# Patient Record
Sex: MALE | Race: WHITE | Employment: OTHER | URBAN - METROPOLITAN AREA
[De-identification: names, ages, dates, MRNs, and addresses within clinical notes are randomized per-mention and may not be internally consistent; named-entity substitution may affect disease eponyms.]

---

## 2017-03-14 ENCOUNTER — TELEPHONE (OUTPATIENT)
Dept: CARDIOLOGY CLINIC | Age: 62
End: 2017-03-14

## 2017-03-14 ENCOUNTER — OFFICE VISIT (OUTPATIENT)
Dept: CARDIOLOGY CLINIC | Age: 62
End: 2017-03-14

## 2017-03-14 VITALS
BODY MASS INDEX: 26.22 KG/M2 | SYSTOLIC BLOOD PRESSURE: 138 MMHG | HEIGHT: 69 IN | HEART RATE: 81 BPM | DIASTOLIC BLOOD PRESSURE: 88 MMHG | OXYGEN SATURATION: 96 % | WEIGHT: 177 LBS

## 2017-03-14 DIAGNOSIS — I25.10 CORONARY ARTERY DISEASE INVOLVING NATIVE HEART WITHOUT ANGINA PECTORIS, UNSPECIFIED VESSEL OR LESION TYPE: Primary | ICD-10-CM

## 2017-03-14 PROCEDURE — 99215 OFFICE O/P EST HI 40 MIN: CPT | Performed by: INTERNAL MEDICINE

## 2017-03-14 RX ORDER — HYDROCHLOROTHIAZIDE 25 MG/1
25 TABLET ORAL DAILY
Qty: 30 TABLET | Refills: 3 | Status: ON HOLD | OUTPATIENT
Start: 2017-03-14 | End: 2017-03-25 | Stop reason: HOSPADM

## 2017-03-17 ENCOUNTER — HOSPITAL ENCOUNTER (OUTPATIENT)
Dept: OTHER | Age: 62
Discharge: HOME | End: 2017-03-17
Attending: INTERNAL MEDICINE | Admitting: INTERNAL MEDICINE

## 2017-03-17 DIAGNOSIS — I25.10 CORONARY ARTERY DISEASE INVOLVING NATIVE HEART WITHOUT ANGINA PECTORIS, UNSPECIFIED VESSEL OR LESION TYPE: ICD-10-CM

## 2017-03-17 PROBLEM — I20.0 UNSTABLE ANGINA (HCC): Status: ACTIVE | Noted: 2017-03-17

## 2017-03-17 LAB
ALBUMIN SERPL-MCNC: 4.4 G/DL (ref 3.4–5)
ALP BLD-CCNC: 59 U/L (ref 40–129)
ALT SERPL-CCNC: 29 U/L (ref 10–40)
ANION GAP SERPL CALCULATED.3IONS-SCNC: 13 MMOL/L (ref 3–16)
AST SERPL-CCNC: 27 U/L (ref 15–37)
BILIRUB SERPL-MCNC: 0.5 MG/DL (ref 0–1)
BILIRUBIN DIRECT: <0.2 MG/DL (ref 0–0.3)
BILIRUBIN, INDIRECT: NORMAL MG/DL (ref 0–1)
BUN BLDV-MCNC: 14 MG/DL (ref 7–20)
CALCIUM SERPL-MCNC: 9.1 MG/DL (ref 8.3–10.6)
CHLORIDE BLD-SCNC: 97 MMOL/L (ref 99–110)
CHOLESTEROL, TOTAL: 164 MG/DL (ref 0–199)
CO2: 26 MMOL/L (ref 21–32)
CREAT SERPL-MCNC: 0.7 MG/DL (ref 0.8–1.3)
GFR AFRICAN AMERICAN: >60
GFR NON-AFRICAN AMERICAN: >60
GLUCOSE BLD-MCNC: 100 MG/DL (ref 70–99)
HDLC SERPL-MCNC: 65 MG/DL (ref 40–60)
LDL CHOLESTEROL CALCULATED: 87 MG/DL
PHOSPHORUS: 2.9 MG/DL (ref 2.5–4.9)
POTASSIUM SERPL-SCNC: 5.4 MMOL/L (ref 3.5–5.1)
SODIUM BLD-SCNC: 136 MMOL/L (ref 136–145)
TOTAL PROTEIN: 7.4 G/DL (ref 6.4–8.2)
TRIGL SERPL-MCNC: 60 MG/DL (ref 0–150)
VLDLC SERPL CALC-MCNC: 12 MG/DL

## 2017-04-12 ENCOUNTER — OFFICE VISIT (OUTPATIENT)
Dept: CARDIOTHORACIC SURGERY | Age: 62
End: 2017-04-12

## 2017-04-12 VITALS
SYSTOLIC BLOOD PRESSURE: 110 MMHG | DIASTOLIC BLOOD PRESSURE: 80 MMHG | BODY MASS INDEX: 25.18 KG/M2 | HEART RATE: 78 BPM | TEMPERATURE: 98.4 F | HEIGHT: 69 IN | WEIGHT: 170 LBS | OXYGEN SATURATION: 99 %

## 2017-04-12 DIAGNOSIS — I25.110 CORONARY ARTERY DISEASE INVOLVING NATIVE CORONARY ARTERY OF NATIVE HEART WITH UNSTABLE ANGINA PECTORIS (HCC): ICD-10-CM

## 2017-04-12 DIAGNOSIS — I20.0 UNSTABLE ANGINA (HCC): Primary | ICD-10-CM

## 2017-04-12 PROCEDURE — 99024 POSTOP FOLLOW-UP VISIT: CPT | Performed by: THORACIC SURGERY (CARDIOTHORACIC VASCULAR SURGERY)

## 2017-04-12 RX ORDER — OXYCODONE HYDROCHLORIDE AND ACETAMINOPHEN 5; 325 MG/1; MG/1
1 TABLET ORAL EVERY 4 HOURS PRN
COMMUNITY
End: 2017-04-12 | Stop reason: SDUPTHER

## 2017-04-12 RX ORDER — OXYCODONE HYDROCHLORIDE AND ACETAMINOPHEN 5; 325 MG/1; MG/1
1 TABLET ORAL EVERY 4 HOURS PRN
Qty: 60 TABLET | Refills: 0 | Status: SHIPPED | OUTPATIENT
Start: 2017-04-12 | End: 2019-02-25

## 2017-04-26 ENCOUNTER — OFFICE VISIT (OUTPATIENT)
Dept: CARDIOTHORACIC SURGERY | Age: 62
End: 2017-04-26

## 2017-04-26 VITALS
SYSTOLIC BLOOD PRESSURE: 120 MMHG | WEIGHT: 169 LBS | OXYGEN SATURATION: 98 % | BODY MASS INDEX: 25.03 KG/M2 | DIASTOLIC BLOOD PRESSURE: 84 MMHG | HEART RATE: 74 BPM | TEMPERATURE: 97.9 F | HEIGHT: 69 IN

## 2017-04-26 DIAGNOSIS — I20.0 UNSTABLE ANGINA (HCC): Primary | ICD-10-CM

## 2017-04-26 DIAGNOSIS — E78.00 PURE HYPERCHOLESTEROLEMIA: ICD-10-CM

## 2017-04-26 DIAGNOSIS — I10 ESSENTIAL HYPERTENSION, BENIGN: ICD-10-CM

## 2017-04-26 DIAGNOSIS — I25.110 CORONARY ARTERY DISEASE INVOLVING NATIVE CORONARY ARTERY OF NATIVE HEART WITH UNSTABLE ANGINA PECTORIS (HCC): ICD-10-CM

## 2017-04-26 PROCEDURE — 99024 POSTOP FOLLOW-UP VISIT: CPT | Performed by: THORACIC SURGERY (CARDIOTHORACIC VASCULAR SURGERY)

## 2017-04-27 ENCOUNTER — OFFICE VISIT (OUTPATIENT)
Dept: CARDIOLOGY CLINIC | Age: 62
End: 2017-04-27

## 2017-04-27 VITALS
HEART RATE: 66 BPM | WEIGHT: 169 LBS | HEIGHT: 69 IN | OXYGEN SATURATION: 98 % | DIASTOLIC BLOOD PRESSURE: 64 MMHG | SYSTOLIC BLOOD PRESSURE: 90 MMHG | BODY MASS INDEX: 25.03 KG/M2

## 2017-04-27 DIAGNOSIS — I10 ESSENTIAL HYPERTENSION, BENIGN: ICD-10-CM

## 2017-04-27 DIAGNOSIS — E78.00 PURE HYPERCHOLESTEROLEMIA: Primary | ICD-10-CM

## 2017-04-27 DIAGNOSIS — I25.110 CORONARY ARTERY DISEASE INVOLVING NATIVE CORONARY ARTERY OF NATIVE HEART WITH UNSTABLE ANGINA PECTORIS (HCC): ICD-10-CM

## 2017-04-27 PROCEDURE — 99214 OFFICE O/P EST MOD 30 MIN: CPT | Performed by: INTERNAL MEDICINE

## 2017-04-27 RX ORDER — ATORVASTATIN CALCIUM 40 MG/1
80 TABLET, FILM COATED ORAL NIGHTLY
Qty: 60 TABLET | Refills: 3
Start: 2017-04-27 | End: 2017-07-26 | Stop reason: DRUGHIGH

## 2017-04-27 RX ORDER — LISINOPRIL 5 MG/1
5 TABLET ORAL
COMMUNITY
Start: 2009-05-13 | End: 2017-06-01 | Stop reason: DRUGHIGH

## 2017-04-28 ENCOUNTER — TELEPHONE (OUTPATIENT)
Dept: CARDIOLOGY CLINIC | Age: 62
End: 2017-04-28

## 2017-05-25 RX ORDER — ISOSORBIDE MONONITRATE 30 MG/1
30 TABLET, EXTENDED RELEASE ORAL DAILY
Qty: 90 TABLET | Refills: 3 | Status: SHIPPED | OUTPATIENT
Start: 2017-05-25 | End: 2017-06-22 | Stop reason: SDUPTHER

## 2017-06-01 ENCOUNTER — OFFICE VISIT (OUTPATIENT)
Dept: CARDIOLOGY CLINIC | Age: 62
End: 2017-06-01

## 2017-06-01 VITALS
HEART RATE: 66 BPM | WEIGHT: 172 LBS | SYSTOLIC BLOOD PRESSURE: 118 MMHG | HEIGHT: 69 IN | DIASTOLIC BLOOD PRESSURE: 86 MMHG | BODY MASS INDEX: 25.48 KG/M2

## 2017-06-01 DIAGNOSIS — I10 ESSENTIAL HYPERTENSION, BENIGN: ICD-10-CM

## 2017-06-01 DIAGNOSIS — Z95.1 S/P CABG X 4: ICD-10-CM

## 2017-06-01 DIAGNOSIS — I20.0 UNSTABLE ANGINA (HCC): ICD-10-CM

## 2017-06-01 DIAGNOSIS — I25.110 CORONARY ARTERY DISEASE INVOLVING NATIVE CORONARY ARTERY OF NATIVE HEART WITH UNSTABLE ANGINA PECTORIS (HCC): Primary | ICD-10-CM

## 2017-06-01 DIAGNOSIS — E78.00 PURE HYPERCHOLESTEROLEMIA: ICD-10-CM

## 2017-06-01 PROCEDURE — 99214 OFFICE O/P EST MOD 30 MIN: CPT | Performed by: NURSE PRACTITIONER

## 2017-06-01 PROCEDURE — 93000 ELECTROCARDIOGRAM COMPLETE: CPT | Performed by: NURSE PRACTITIONER

## 2017-06-01 RX ORDER — NITROGLYCERIN 0.4 MG/1
0.4 TABLET SUBLINGUAL EVERY 5 MIN PRN
Qty: 25 TABLET | Refills: 3 | Status: SHIPPED | OUTPATIENT
Start: 2017-06-01 | End: 2022-03-24 | Stop reason: SDUPTHER

## 2017-06-01 RX ORDER — LISINOPRIL 5 MG/1
5 TABLET ORAL NIGHTLY
Qty: 30 TABLET | Refills: 3 | Status: SHIPPED
Start: 2017-06-01 | End: 2018-07-10 | Stop reason: SDUPTHER

## 2017-06-22 ENCOUNTER — OFFICE VISIT (OUTPATIENT)
Dept: CARDIOLOGY CLINIC | Age: 62
End: 2017-06-22

## 2017-06-22 VITALS
BODY MASS INDEX: 25.18 KG/M2 | HEIGHT: 69 IN | SYSTOLIC BLOOD PRESSURE: 98 MMHG | HEART RATE: 72 BPM | DIASTOLIC BLOOD PRESSURE: 78 MMHG | WEIGHT: 170 LBS

## 2017-06-22 DIAGNOSIS — E78.00 PURE HYPERCHOLESTEROLEMIA: ICD-10-CM

## 2017-06-22 DIAGNOSIS — I25.118 CORONARY ARTERY DISEASE OF NATIVE ARTERY OF NATIVE HEART WITH STABLE ANGINA PECTORIS (HCC): Primary | ICD-10-CM

## 2017-06-22 DIAGNOSIS — I10 ESSENTIAL HYPERTENSION, BENIGN: ICD-10-CM

## 2017-06-22 DIAGNOSIS — Z95.1 S/P CABG X 4: ICD-10-CM

## 2017-06-22 PROCEDURE — 99214 OFFICE O/P EST MOD 30 MIN: CPT | Performed by: NURSE PRACTITIONER

## 2017-06-22 RX ORDER — ISOSORBIDE MONONITRATE 30 MG/1
30 TABLET, EXTENDED RELEASE ORAL DAILY
Qty: 90 TABLET | Refills: 3
Start: 2017-06-22 | End: 2017-12-19 | Stop reason: DRUGHIGH

## 2017-07-05 PROBLEM — I20.8 STABLE ANGINA (HCC): Status: ACTIVE | Noted: 2017-07-05

## 2017-07-05 PROBLEM — I20.89 STABLE ANGINA: Status: ACTIVE | Noted: 2017-07-05

## 2017-07-13 ENCOUNTER — TELEPHONE (OUTPATIENT)
Dept: CARDIOLOGY CLINIC | Age: 62
End: 2017-07-13

## 2017-07-13 DIAGNOSIS — I25.110 CORONARY ARTERY DISEASE INVOLVING NATIVE CORONARY ARTERY OF NATIVE HEART WITH UNSTABLE ANGINA PECTORIS (HCC): Primary | ICD-10-CM

## 2017-07-13 DIAGNOSIS — R07.9 CHEST PAIN, UNSPECIFIED TYPE: ICD-10-CM

## 2017-07-14 ENCOUNTER — TELEPHONE (OUTPATIENT)
Dept: CARDIOLOGY CLINIC | Age: 62
End: 2017-07-14

## 2017-07-20 ENCOUNTER — HOSPITAL ENCOUNTER (OUTPATIENT)
Dept: CARDIOLOGY | Facility: CLINIC | Age: 62
Discharge: HOME | End: 2017-07-20
Attending: INTERNAL MEDICINE | Admitting: INTERNAL MEDICINE

## 2017-07-20 LAB
LV EF: 71 %
LVEF MODALITY: NORMAL

## 2017-07-21 ENCOUNTER — TELEPHONE (OUTPATIENT)
Dept: CARDIOLOGY CLINIC | Age: 62
End: 2017-07-21

## 2017-07-24 RX ORDER — RANOLAZINE 500 MG/1
TABLET, FILM COATED, EXTENDED RELEASE ORAL
Qty: 60 TABLET | Refills: 5 | OUTPATIENT
Start: 2017-07-24

## 2017-07-24 RX ORDER — RANOLAZINE 1000 MG/1
500 TABLET, EXTENDED RELEASE ORAL 2 TIMES DAILY
Qty: 180 TABLET | Refills: 3 | Status: SHIPPED | OUTPATIENT
Start: 2017-07-24 | End: 2018-06-25 | Stop reason: SDUPTHER

## 2017-07-25 ENCOUNTER — TELEPHONE (OUTPATIENT)
Dept: CARDIOLOGY CLINIC | Age: 62
End: 2017-07-25

## 2017-07-26 ENCOUNTER — OFFICE VISIT (OUTPATIENT)
Dept: CARDIOLOGY CLINIC | Age: 62
End: 2017-07-26

## 2017-07-26 VITALS
OXYGEN SATURATION: 97 % | DIASTOLIC BLOOD PRESSURE: 70 MMHG | HEIGHT: 69 IN | SYSTOLIC BLOOD PRESSURE: 100 MMHG | WEIGHT: 169 LBS | BODY MASS INDEX: 25.03 KG/M2 | HEART RATE: 75 BPM

## 2017-07-26 DIAGNOSIS — I10 ESSENTIAL HYPERTENSION, BENIGN: ICD-10-CM

## 2017-07-26 DIAGNOSIS — E78.00 PURE HYPERCHOLESTEROLEMIA: Primary | ICD-10-CM

## 2017-07-26 DIAGNOSIS — Z95.5 S/P CORONARY ARTERY STENT PLACEMENT: ICD-10-CM

## 2017-07-26 DIAGNOSIS — I25.110 CORONARY ARTERY DISEASE INVOLVING NATIVE CORONARY ARTERY OF NATIVE HEART WITH UNSTABLE ANGINA PECTORIS (HCC): ICD-10-CM

## 2017-07-26 PROCEDURE — 99214 OFFICE O/P EST MOD 30 MIN: CPT | Performed by: INTERNAL MEDICINE

## 2017-07-26 RX ORDER — ATORVASTATIN CALCIUM 80 MG/1
80 TABLET, FILM COATED ORAL NIGHTLY
Qty: 30 TABLET | Refills: 3 | Status: SHIPPED
Start: 2017-07-26 | End: 2018-07-10 | Stop reason: SDUPTHER

## 2017-07-31 ENCOUNTER — TELEPHONE (OUTPATIENT)
Dept: CARDIOLOGY CLINIC | Age: 62
End: 2017-07-31

## 2017-08-03 ENCOUNTER — TELEPHONE (OUTPATIENT)
Dept: CARDIOLOGY CLINIC | Age: 62
End: 2017-08-03

## 2017-08-24 ENCOUNTER — TELEPHONE (OUTPATIENT)
Dept: CARDIOLOGY CLINIC | Age: 62
End: 2017-08-24

## 2017-08-28 ENCOUNTER — OFFICE VISIT (OUTPATIENT)
Dept: CARDIOLOGY CLINIC | Age: 62
End: 2017-08-28

## 2017-08-28 VITALS
DIASTOLIC BLOOD PRESSURE: 88 MMHG | WEIGHT: 174 LBS | SYSTOLIC BLOOD PRESSURE: 130 MMHG | BODY MASS INDEX: 25.77 KG/M2 | HEIGHT: 69 IN | HEART RATE: 68 BPM | OXYGEN SATURATION: 96 %

## 2017-08-28 DIAGNOSIS — I25.110 CORONARY ARTERY DISEASE INVOLVING NATIVE CORONARY ARTERY OF NATIVE HEART WITH UNSTABLE ANGINA PECTORIS (HCC): ICD-10-CM

## 2017-08-28 DIAGNOSIS — Z95.5 S/P CORONARY ARTERY STENT PLACEMENT: ICD-10-CM

## 2017-08-28 DIAGNOSIS — I50.32 CHRONIC DIASTOLIC CONGESTIVE HEART FAILURE (HCC): ICD-10-CM

## 2017-08-28 DIAGNOSIS — I10 ESSENTIAL HYPERTENSION, BENIGN: Primary | ICD-10-CM

## 2017-08-28 PROCEDURE — 99214 OFFICE O/P EST MOD 30 MIN: CPT | Performed by: INTERNAL MEDICINE

## 2017-09-28 ENCOUNTER — TELEPHONE (OUTPATIENT)
Dept: CARDIOLOGY CLINIC | Age: 62
End: 2017-09-28

## 2017-10-09 ENCOUNTER — OFFICE VISIT (OUTPATIENT)
Dept: CARDIOLOGY CLINIC | Age: 62
End: 2017-10-09

## 2017-10-09 VITALS
BODY MASS INDEX: 26.22 KG/M2 | OXYGEN SATURATION: 94 % | HEART RATE: 77 BPM | DIASTOLIC BLOOD PRESSURE: 68 MMHG | SYSTOLIC BLOOD PRESSURE: 114 MMHG | HEIGHT: 69 IN | WEIGHT: 177 LBS

## 2017-10-09 DIAGNOSIS — Z95.5 S/P CORONARY ARTERY STENT PLACEMENT: Primary | ICD-10-CM

## 2017-10-09 DIAGNOSIS — E78.00 PURE HYPERCHOLESTEROLEMIA: ICD-10-CM

## 2017-10-09 PROCEDURE — 99214 OFFICE O/P EST MOD 30 MIN: CPT | Performed by: INTERNAL MEDICINE

## 2017-10-09 RX ORDER — VITAMIN B COMPLEX
120 TABLET ORAL DAILY
Qty: 30 CAPSULE | Refills: 0 | Status: SHIPPED | OUTPATIENT
Start: 2017-10-09

## 2017-10-09 RX ORDER — NIACIN 500 MG/1
500 TABLET, EXTENDED RELEASE ORAL NIGHTLY
Qty: 30 TABLET | Refills: 3 | Status: SHIPPED | OUTPATIENT
Start: 2017-10-09 | End: 2018-07-10 | Stop reason: SDUPTHER

## 2017-10-09 NOTE — PROGRESS NOTES
[address]   Cardiac Followup    Referring Provider:  Jenette Habermann     Chief Complaint   Patient presents with    3 Month Follow-Up     2 month follow up    Coronary Artery Disease    Hypertension    Congestive Heart Failure    Hyperlipidemia    Chest Pain     only 2 times during cardiac rehab, started the week before last.     Edema     right leg from surgery,has been getting worse.  Fatigue     pt thinks due to rehab, since he cut back to 2 days a week      Subjective: Mr Sadaf Lilly presents today for cardiology follow up of recent [facility] s/p stents, hx CABG, CAD, HTN, HLD     History of Present Illness:    Mr. Sadaf Lilly is a 57yo male with hx of CABG. He also has history of CAD, HTN, and hyperlipidemia. He is s/p LAD and OM stent with PTCA only of the diagonal branch in 2003. On 5/12/09 he underwent repeat intervention with ALLYN of the OM branch overlapping a prior placed stent in 2003. His EF was vigorous and estimated at 70% per LVG. His small nondominant RCA was subtotally occluded, which was unchanged from 2003. Note cath on 9/30/11 for c/o new-onset exertional chest pain during office visit 9/23/11. Cath showed widely patent stents previously placed to the left anterior descending stent and obtuse marginal branches. There is mild diffuse disease noted throughout, but no critical focal stenoses. The right coronary artery is nondominant and has severe mid-vessel disease, but this is unchanged from May 2009 study. There are some L-R collaterals to the distal RCA noted as well. Most recent 11/22/2013 Lexiscan Myoview normal myocardial perfusion study. Hyperdynamic LV systolic function with DB>38% with uniform wall motion and normal perfusion with EF=71%. Due to exertional CP and SOB symptoms I referred him for [facility] on 3/17/17 which showed 70-80% LAD, 99% D1, 70% OM1, 90% OM2, 100% mid RCA; normal LVEF; normal hemodynamics. Currently, hemodynamically stable.  Referred for surgical Raji Cortez MD        Allergies:  Review of patient's allergies indicates no known allergies. Review of Systems:   · Constitutional: there has been no unanticipated weight loss. There's been no change in energy level, sleep pattern, or activity level. · Eyes: No visual changes or diplopia. No scleral icterus. · ENT: No Headaches, hearing loss or vertigo. No mouth sores or sore throat. · Cardiovascular: Reviewed in HPI  · Respiratory: No cough or wheezing, no sputum production. No hematemesis. · Gastrointestinal: No abdominal pain, appetite loss, blood in stools. No change in bowel or bladder habits. · Genitourinary: No dysuria, trouble voiding, or hematuria. · Musculoskeletal:  No gait disturbance, weakness or joint complaints. · Integumentary: No rash or pruritis. · Neurological: No headache, diplopia, change in muscle strength, numbness or tingling. No change in gait, balance, coordination, mood, affect, memory, mentation, behavior. · Psychiatric: No anxiety, no depression. · Endocrine: No malaise, fatigue or temperature intolerance. No excessive thirst, fluid intake, or urination. No tremor. · Hematologic/Lymphatic: No abnormal bruising or bleeding, blood clots or swollen lymph nodes. · Allergic/Immunologic: No nasal congestion or hives. Physical Examination:    Vitals:    10/09/17 0836   BP: 114/68   Pulse: 77   SpO2: 94%        Constitutional and General Appearance: NAD   Respiratory:  · Normal excursion and expansion without use of accessory muscles  · Resp Auscultation: Normal breath sounds without dullness  Cardiovascular:  · The apical impulses not displaced  · Heart tones are crisp and normal  · Cervical veins are not engorged  · The carotid upstroke is normal in amplitude and contour without delay or bruit  · Normal S1S2, No S3, No Murmur  · Peripheral pulses are symmetrical and full  · There is no clubbing, cyanosis of the extremities.   · Trace edema RLE wearing SWATI hose  · Femoral Arteries: 2+ and equal  · Pedal Pulses: 2+ and equal   Abdomen:  · No masses or tenderness  · Liver/Spleen: No Abnormalities Noted  Neurological/Psychiatric:  · Alert and oriented in all spheres  · Moves all extremities well  · Exhibits normal gait balance and coordination  · No abnormalities of mood, affect, memory, mentation, or behavior are noted    GXT stress Test 7/20/17   ABNORMAL HIGH risk stress test  Normal LV size and systolic function. Left ventricular ejection fraction of  71%. Normal wall motion. There is a large severe defect in the anterior and  anterolateral walls at stress that completely reveres with rest consistent  with ischemia. Lab Results   Component Value Date    CHOL 156 07/05/2017    CHOL 148 03/21/2017    CHOL 164 03/17/2017     Lab Results   Component Value Date    TRIG 152 (H) 07/05/2017    TRIG 129 03/21/2017    TRIG 60 03/17/2017     Lab Results   Component Value Date    HDL 52 07/05/2017    HDL 63 (H) 03/21/2017    HDL 65 (H) 03/17/2017     Lab Results   Component Value Date    LDLCALC 74 07/05/2017    [address] 59 03/21/2017    LDLCALC 87 03/17/2017     Lab Results   Component Value Date    LABVLDL 30 07/05/2017    LABVLDL 26 03/21/2017    LABVLDL 12 03/17/2017     No results found for: CHOLHDLRATIO    Assessment:     1. CAD (coronary artery disease):  Due to exertional CP and SOB symptoms I referred him for LHC on 3/17/17 which showed multi-vessel CAD. Referred for surgical revascularization and on 3/21/17 he underwent 4V CABG by Dr. Renu Vann with GONSALVES to LAD and reverse SVG to ramus OM distal to the stent and PDA. Most recent stress test 7/20/17 ABNORMAL HIGH risk stress test; LVEF of  71%. There is a large severe defect in the anterior and  anterolateral walls at stress that completely reversible with rest consistent with ischemia.  He underwent RHC, LHC, LVG on 7/5/17 showing severe disease left and right coronary systems with patent LIMA graft but disease of

## 2017-10-09 NOTE — COMMUNICATION BODY
revascularization and on 3/21/17 he underwent 4V CABG by Dr. Penne Skiff with LIMA to LAD and reverse SVG to ramus OM distal to the stent and PDA. Most recent VL carotid Duplex showed 3/20/17 showed minimal plaque <50% bilaterally. The vertebral arteries are patent with antegrade flow bilaterally. Most recent ECHO on 0/32/78 showed LV systolic function is normal with an ejection fraction of 60-65%. No wall motion abnormalities noted. Grade I diastolic dysfunction with normal filling pressure. Mild aortic stenosis (AV velocity=2.23m/s with mean pressure gradient=10mmHg). Note EKG 3/17/17 showed NSR 70bpm.    Most recent stress test 7/20/17 ABNORMAL HIGH risk stress test; LVEF of  71%. There is a large severe defect in the anterior and  anterolateral walls at stress that completely reversible with rest consistent with ischemia. He underwent RHC, LHC, LVG on 7/5/17 showing severe disease left and right coronary systems with patent LIMA graft but disease of remaining grafts and one occluded. Subsequently, he underwent most recent [facility] 8/7/17 PCI Complex LAD, diag Rt groin and on again 8/9/17 had PCI of complex circumflex, RCA  with Dr. Fred Rodriguez at [facility]. Amlodipine was discontinued by Dr. Fred Rodriguez. Most recent Limited ECHO 8/3/29 showed LV systolic function was normal.     Today patient states he has been feeling good. He is tolerating his medications. He states his Plavix has been increased to 150 mg daily by Dr. Liz Marin at [facility] (he went for 2nd opinion) based on genetic testing results. His blood work showed systemic inflammation with elevated CRP,  lipoprotein A, ADP response 42,.indidicating a lack of full response. At this time plavix was increased and niacin was added. Denies chest pain, shortness of breath, dizziness, palpitations and syncope. He as been attending cardiac rehab twice a week at [address] [place] [address],[address] and would like to continue going.   He states 2 weeks ago he did have some chest tightness and left arm Arteries: 2+ and equal  · Pedal Pulses: 2+ and equal   Abdomen:  · No masses or tenderness  · Liver/Spleen: No Abnormalities Noted  Neurological/Psychiatric:  · Alert and oriented in all spheres  · Moves all extremities well  · Exhibits normal gait balance and coordination  · No abnormalities of mood, affect, memory, mentation, or behavior are noted    GXT stress Test 7/20/17   ABNORMAL HIGH risk stress test  Normal LV size and systolic function. Left ventricular ejection fraction of  71%. Normal wall motion. There is a large severe defect in the anterior and  anterolateral walls at stress that completely reveres with rest consistent  with ischemia. Lab Results   Component Value Date    CHOL 156 07/05/2017    CHOL 148 03/21/2017    CHOL 164 03/17/2017     Lab Results   Component Value Date    TRIG 152 (H) 07/05/2017    TRIG 129 03/21/2017    TRIG 60 03/17/2017     Lab Results   Component Value Date    HDL 52 07/05/2017    HDL 63 (H) 03/21/2017    HDL 65 (H) 03/17/2017     Lab Results   Component Value Date    LDLCALC 74 07/05/2017    [address] 59 03/21/2017    LDLCALC 87 03/17/2017     Lab Results   Component Value Date    LABVLDL 30 07/05/2017    LABVLDL 26 03/21/2017    LABVLDL 12 03/17/2017     No results found for: CHOLHDLRATIO    Assessment:     1. CAD (coronary artery disease):  Due to exertional CP and SOB symptoms I referred him for LHC on 3/17/17 which showed  multi-vessel CAD. Referred for surgical revascularization and on 3/21/17 he underwent 4V CABG by Dr. Annemarie Rolon with LIMA to LAD and reverse SVG to ramus OM distal to the stent and PDA. Most recent stress test 7/20/17 ABNORMAL HIGH risk stress test; LVEF of  71%. There is a large severe defect in the anterior and  anterolateral walls at stress that completely reversible with rest consistent with ischemia.  He underwent RHC, LHC, LVG on 7/5/17 showing severe disease left and right coronary systems with patent LIMA graft but disease of remaining grafts and one occluded. Subsequently, he underwent most recent [facility] 8/7/17 PCI Complex LAD, diag Rt groin and on again 8/9/17 had PCI of complex circumflex, RCA  with Dr. Louis Flores at [facility]. Prior symptoms of exertional SOB and left arm pain are resolved. 2. Essential hypertension, benign:  Well controlled and will continue current medical regimen. 3.  Hyperlipidemia: 7/5/17 , HDL 52 LDL 74, . Well controlled and at goal and will continue current medical regimen. Plan:  1. Doing very well from cardiac standpoint. Couple episodes of angina likely but no further issues. Call if pattern of increasing frequency or severity of angina. 2. Continue all medications as prescribed   3. Elevate feet when possible and wear compression stockings   4. Referral to continue cardiac rehab at [facility]   5. Follow up with me in 3 months     Cost of prescription medications and patient compliance have been reviewed with patient. All questions answered. Thank you for allowing me to participate in the care of this individual.    Eliana Woodard.  Dasia Mejia M.D., [facility] [place]

## 2017-10-09 NOTE — LETTER
is unchanged from May 2009 study. There are some L-R collaterals to the distal RCA noted as well. Most recent 11/22/2013 Lexiscan Myoview normal myocardial perfusion study. Hyperdynamic LV systolic function with AK>88% with uniform wall motion and normal perfusion with EF=71%. Due to exertional CP and SOB symptoms I referred him for [facility] on 3/17/17 which showed 70-80% LAD, 99% D1, 70% OM1, 90% OM2, 100% mid RCA; normal LVEF; normal hemodynamics. Currently, hemodynamically stable. Referred for surgical revascularization and on 3/21/17 he underwent 4V CABG by Dr. Bakari Prasad with LIMA to LAD and reverse SVG to ramus OM distal to the stent and PDA. Most recent VL carotid Duplex showed 3/20/17 showed minimal plaque <50% bilaterally. The vertebral arteries are patent with antegrade flow bilaterally. Most recent ECHO on 0/29/09 showed LV systolic function is normal with an ejection fraction of 60-65%. No wall motion abnormalities noted. Grade I diastolic dysfunction with normal filling pressure. Mild aortic stenosis (AV velocity=2.23m/s with mean pressure gradient=10mmHg). Note EKG 3/17/17 showed NSR 70bpm.    Most recent stress test 7/20/17 ABNORMAL HIGH risk stress test; LVEF of  71%. There is a large severe defect in the anterior and  anterolateral walls at stress that completely reversible with rest consistent with ischemia. He underwent RHC, LHC, LVG on 7/5/17 showing severe disease left and right coronary systems with patent LIMA graft but disease of remaining grafts and one occluded. Subsequently, he underwent most recent [facility] 8/7/17 PCI Complex LAD, diag Rt groin and on again 8/9/17 had PCI of complex circumflex, RCA  with Dr. Cristy Jaeger at [facility]. Amlodipine was discontinued by Dr. Cristy Jaeger. Most recent Limited ECHO 5/3/27 showed LV systolic function was normal.     Today patient states he has been feeling good. He is tolerating his medications.  He states his Plavix has been increased to 150 mg daily by Dr. Viviana Ramírez at [facility] (he went for 2nd opinion) based on genetic testing results. His blood work showed systemic inflammation with elevated CRP,  lipoprotein A, ADP response 42,.indidicating a lack of full response. At this time plavix was increased and niacin was added. Denies chest pain, shortness of breath, dizziness, palpitations and syncope. He as been attending cardiac rehab twice a week at [address] [place] [address],[address] and would like to continue going. He states 2 weeks ago he did have some chest tightness and left arm pain. He was on the treadmill at the time and this has since resolved. He has no return of this pain and has been able to pressure wash his house. He does have swelling in his RLE (site of vein harvest) at times. Past Medical History   has a past medical history of CAD (coronary artery disease); Family history of early CAD; Hyperlipidemia; Hypertension; and S/P coronary artery stent placement. Surgical History:   has a past surgical history that includes Coronary angioplasty with stent; Cardiac catheterization (09/26/2011); Coronary artery bypass graft (03/21/2017); Cardiac catheterization (03/17/2017); Coronary angioplasty with stent (05/12/2009); Coronary angioplasty with stent (08/15/2003); and Coronary angioplasty with stent. Social History:   reports that he quit smoking about 24 years ago. He quit after 12.00 years of use. He has never used smokeless tobacco. He reports that he drinks alcohol. He reports that he does not use drugs. 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 Family History:  family history is not on file. Home Medications:  Prior to Admission medications    Medication Sig Start Date End Date Taking? Authorizing Provider   naproxen (NAPROXEN) 500 MG EC tablet Take 500 mg by mouth 2 times daily (with meals). Prn      Yes Historical Provider, MD   lisinopril (PRINIVIL;ZESTRIL) 10 MG tablet Take 1 tablet by mouth daily.  9/19/11  Yes Rox Pulido MD aspirin 81 MG tablet Take 81 mg by mouth daily. Yes Historical Provider, MD   ranitidine (ZANTAC) 150 MG tablet Take 150 mg by mouth daily as needed. Yes Historical Provider, MD   atorvastatin (LIPITOR) 40 MG tablet Take 40 mg by mouth nightly. Yes Historical Provider, MD   clopidogrel (PLAVIX) 75 MG tablet Take 75 mg by mouth daily. Yes Historical Provider, MD   nitroGLYCERIN (NITROSTAT) 0.4 MG SL tablet Place 1 tablet under the tongue every 5 minutes as needed for Chest pain. 9/19/11 9/18/12  Trish Lopez MD        Allergies:  Review of patient's allergies indicates no known allergies. Review of Systems:   · Constitutional: there has been no unanticipated weight loss. There's been no change in energy level, sleep pattern, or activity level. · Eyes: No visual changes or diplopia. No scleral icterus. · ENT: No Headaches, hearing loss or vertigo. No mouth sores or sore throat. · Cardiovascular: Reviewed in HPI  · Respiratory: No cough or wheezing, no sputum production. No hematemesis. · Gastrointestinal: No abdominal pain, appetite loss, blood in stools. No change in bowel or bladder habits. · Genitourinary: No dysuria, trouble voiding, or hematuria. · Musculoskeletal:  No gait disturbance, weakness or joint complaints. · Integumentary: No rash or pruritis. · Neurological: No headache, diplopia, change in muscle strength, numbness or tingling. No change in gait, balance, coordination, mood, affect, memory, mentation, behavior. · Psychiatric: No anxiety, no depression. · Endocrine: No malaise, fatigue or temperature intolerance. No excessive thirst, fluid intake, or urination. No tremor. · Hematologic/Lymphatic: No abnormal bruising or bleeding, blood clots or swollen lymph nodes. · Allergic/Immunologic: No nasal congestion or hives.     Physical Examination:    Vitals:    10/09/17 0836   BP: 114/68   Pulse: 77   SpO2: 94%        Constitutional and General Appearance: NAD for surgical revascularization and on 3/21/17 he underwent 4V CABG by Dr. Enriqueta Smiley with LIMA to LAD and reverse SVG to ramus OM distal to the stent and PDA. Most recent stress test 7/20/17 ABNORMAL HIGH risk stress test; LVEF of  71%. There is a large severe defect in the anterior and  anterolateral walls at stress that completely reversible with rest consistent with ischemia. He underwent RHC, LHC, LVG on 7/5/17 showing severe disease left and right coronary systems with patent LIMA graft but disease of remaining grafts and one occluded. Subsequently, he underwent most recent [facility] 8/7/17 PCI Complex LAD, diag Rt groin and on again 8/9/17 had PCI of complex circumflex, RCA  with Dr. Home Bansal at [facility]. Prior symptoms of exertional SOB and left arm pain are resolved. 2. Essential hypertension, benign:  Well controlled and will continue current medical regimen. 3.  Hyperlipidemia: 7/5/17 , HDL 52 LDL 74, . Well controlled and at goal and will continue current medical regimen. Plan:  1. Doing very well from cardiac standpoint. Couple episodes of angina likely but no further issues. Call if pattern of increasing frequency or severity of angina. 2. Continue all medications as prescribed   3. Elevate feet when possible and wear compression stockings   4. Referral to continue cardiac rehab at [facility]   5. Follow up with me in 3 months     Cost of prescription medications and patient compliance have been reviewed with patient. All questions answered. Thank you for allowing me to participate in the care of this individual.    Bill Miles. Shweta Nelson M.D., [facility] [place]            If you have questions, please do not hesitate to call me. I look forward to following George Gr along with you.     Sincerely,        Trish Lopez MD

## 2017-10-09 NOTE — PATIENT INSTRUCTIONS
Plan:  1. Doing very well from cardiac standpoint  2. Continue all medications as prescribed   3. Elevate feet when possible and wear compression stockings   4. Referral to continue cardiac rehab at [address] [place] [address]   5.  Follow up with me in 3 months

## 2017-12-18 ENCOUNTER — TELEPHONE (OUTPATIENT)
Dept: CARDIOLOGY CLINIC | Age: 62
End: 2017-12-18

## 2017-12-18 NOTE — TELEPHONE ENCOUNTER
Pt was calling in regards to:  Pt needs a refill of his rx of Metoprolol and would like to have 3mos supply instead of 1 mo at a time. Pt's pharmacy - [facility], also, [facility] Mendes - pt was at his cardiac rehab today and while he was on the treadmill he started having pain in left arm that radiated down to his wrist.  They slowed him down at rehab and eventually the pain went away and pt felt fine enough to finish his work out. Pt just wanted Dr Sin Jones to know about this.   Please call pt to discuss

## 2017-12-19 RX ORDER — ISOSORBIDE MONONITRATE 60 MG/1
60 TABLET, EXTENDED RELEASE ORAL DAILY
Qty: 30 TABLET | Refills: 3 | Status: SHIPPED | OUTPATIENT
Start: 2017-12-19 | End: 2017-12-26 | Stop reason: SDUPTHER

## 2017-12-19 NOTE — TELEPHONE ENCOUNTER
I called patient and told him to increase imdur to 60mg po BID. Please call in a script for increased dosing regiment o [employer]. He is to call back if still having left arm pain symptoms despite increased medical management. Thanks.

## 2017-12-26 DIAGNOSIS — I50.32 CHRONIC DIASTOLIC CONGESTIVE HEART FAILURE (HCC): ICD-10-CM

## 2017-12-26 DIAGNOSIS — I25.110 CORONARY ARTERY DISEASE INVOLVING NATIVE CORONARY ARTERY OF NATIVE HEART WITH UNSTABLE ANGINA PECTORIS (HCC): ICD-10-CM

## 2017-12-26 DIAGNOSIS — I10 ESSENTIAL HYPERTENSION, BENIGN: Primary | ICD-10-CM

## 2017-12-27 RX ORDER — ISOSORBIDE MONONITRATE 60 MG/1
60 TABLET, EXTENDED RELEASE ORAL 2 TIMES DAILY
Qty: 60 TABLET | Refills: 5 | Status: SHIPPED | OUTPATIENT
Start: 2017-12-27 | End: 2018-07-10 | Stop reason: SDUPTHER

## 2018-01-10 ENCOUNTER — OFFICE VISIT (OUTPATIENT)
Dept: CARDIOLOGY CLINIC | Age: 63
End: 2018-01-10

## 2018-01-10 VITALS
WEIGHT: 181.5 LBS | BODY MASS INDEX: 26.88 KG/M2 | DIASTOLIC BLOOD PRESSURE: 70 MMHG | HEIGHT: 69 IN | HEART RATE: 75 BPM | SYSTOLIC BLOOD PRESSURE: 110 MMHG | OXYGEN SATURATION: 98 %

## 2018-01-10 DIAGNOSIS — Z95.5 S/P CORONARY ARTERY STENT PLACEMENT: Primary | ICD-10-CM

## 2018-01-10 DIAGNOSIS — R07.2 PRECORDIAL PAIN: ICD-10-CM

## 2018-01-10 DIAGNOSIS — I10 ESSENTIAL HYPERTENSION, BENIGN: ICD-10-CM

## 2018-01-10 DIAGNOSIS — I25.110 CORONARY ARTERY DISEASE INVOLVING NATIVE CORONARY ARTERY OF NATIVE HEART WITH UNSTABLE ANGINA PECTORIS (HCC): ICD-10-CM

## 2018-01-10 DIAGNOSIS — E78.00 PURE HYPERCHOLESTEROLEMIA: ICD-10-CM

## 2018-01-10 DIAGNOSIS — I50.32 CHRONIC DIASTOLIC CONGESTIVE HEART FAILURE (HCC): ICD-10-CM

## 2018-01-10 PROCEDURE — 99215 OFFICE O/P EST HI 40 MIN: CPT | Performed by: INTERNAL MEDICINE

## 2018-01-10 RX ORDER — AMOXICILLIN AND CLAVULANATE POTASSIUM 875; 125 MG/1; MG/1
1 TABLET, FILM COATED ORAL 2 TIMES DAILY
COMMUNITY
End: 2019-02-25 | Stop reason: ALTCHOICE

## 2018-01-10 NOTE — PATIENT INSTRUCTIONS
Plan: 1. GXT Myoview. Hold Metoprolol the day of the test  2. Continue taking all medications as prescribed  3. Follow up with me in 6 months   4.  CMP and Lipids day of stress test

## 2018-01-10 NOTE — PROGRESS NOTES
[address]   Cardiac Followup    Referring Provider:  Randy Melena     Chief Complaint   Patient presents with    3 Month Follow-Up    Shortness of Breath    Other     pressure under left arm across chest      Subjective: Mr Willian Garrido presents today for cardiology follow up of CAD s/p stents, hx CABG, HTN, HLD; c/o left chest CP and SOb    History of Present Illness:    Mr. Willian Garrido is a 56yo male with hx of CABG. He also has history of CAD, HTN, and hyperlipidemia. He is s/p LAD and OM stent with PTCA only of the diagonal branch in 2003. On 5/12/09 he underwent repeat intervention with ALLYN of the OM branch overlapping a prior placed stent in 2003. His EF was vigorous and estimated at 70% per LVG. His small nondominant RCA was subtotally occluded, which was unchanged from 2003. Note cath on 9/30/11 for c/o new-onset exertional chest pain during office visit 9/23/11. Cath showed widely patent stents previously placed to the left anterior descending stent and obtuse marginal branches. There is mild diffuse disease noted throughout, but no critical focal stenoses. The right coronary artery is nondominant and has severe mid-vessel disease, but this is unchanged from May 2009 study. There are some L-R collaterals to the distal RCA noted as well. Most recent 11/22/2013 Lexiscan Myoview normal myocardial perfusion study. Hyperdynamic LV systolic function with WQ>72% with uniform wall motion and normal perfusion with EF=71%. Due to exertional CP and SOB symptoms I referred him for [facility] on 3/17/17 which showed 70-80% LAD, 99% D1, 70% OM1, 90% OM2, 100% mid RCA; normal LVEF; normal hemodynamics. Currently, hemodynamically stable. Referred for surgical revascularization and on 3/21/17 he underwent 4V CABG by Dr. Juanjose Meraz with LIMA to LAD and reverse SVG to ramus OM distal to the stent and PDA. Most recent  carotid Duplex showed 3/20/17 showed minimal plaque <50% bilaterally.  The vertebral arteries are patent dizziness, palpitations and syncope. Past Medical History   has a past medical history of CAD (coronary artery disease); Family history of early CAD; Hyperlipidemia; Hypertension; and S/P coronary artery stent placement. Surgical History:   has a past surgical history that includes Coronary angioplasty with stent; Cardiac catheterization (09/26/2011); Coronary artery bypass graft (03/21/2017); Cardiac catheterization (03/17/2017); Coronary angioplasty with stent (05/12/2009); Coronary angioplasty with stent (08/15/2003); and Coronary angioplasty with stent. Social History:   reports that he quit smoking about 24 years ago. He quit after 12.00 years of use. He has never used smokeless tobacco. He reports that he drinks alcohol. He reports that he does not use drugs. 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 Family History:  family history is not on file. Home Medications:  Prior to Admission medications    Medication Sig Start Date End Date Taking? Authorizing Provider   naproxen (NAPROXEN) 500 MG EC tablet Take 500 mg by mouth 2 times daily (with meals). Prn      Yes Historical Provider, MD   lisinopril (PRINIVIL;ZESTRIL) 10 MG tablet Take 1 tablet by mouth daily. 9/19/11  Yes Chu Holder MD   aspirin 81 MG tablet Take 81 mg by mouth daily. Yes Historical Provider, MD   ranitidine (ZANTAC) 150 MG tablet Take 150 mg by mouth daily as needed. Yes Historical Provider, MD   atorvastatin (LIPITOR) 40 MG tablet Take 40 mg by mouth nightly. Yes Historical Provider, MD   clopidogrel (PLAVIX) 75 MG tablet Take 75 mg by mouth daily. Yes Historical Provider, MD   nitroGLYCERIN (NITROSTAT) 0.4 MG SL tablet Place 1 tablet under the tongue every 5 minutes as needed for Chest pain. 9/19/11 9/18/12  Chu Holder MD        Allergies:  Review of patient's allergies indicates no known allergies. Review of Systems:   · Constitutional: there has been no unanticipated weight loss.

## 2018-01-12 ENCOUNTER — TELEPHONE (OUTPATIENT)
Dept: CARDIOLOGY CLINIC | Age: 63
End: 2018-01-12

## 2018-01-12 NOTE — TELEPHONE ENCOUNTER
Bismark Buenrostro to pt. He called office due to problems scheduling stress test through [employer]. He also stated he had ongoing CP. He states the CP is \"breakthrough\" and happens a few times a day. He describes the pain as a discomfort that initiates at his left pectoral muscle with radiation into his left axilla and down his left arm. He states he was having similar CP during his OV on 01/10/18. He states he has been taking Ranexa and Imdur as prescribed. Recommended the patient to go to ER for CP. He states he feels the CP is not bad enough to go to the ER. Informed patient if the CP recurs to take a nitro. If the pain is not subsided after 2 nitro, he is to go to the ER. His stress test was rescheduled to an earlier time for Tues instead of Thur as scheduled by [employer]. Pt states if the CP worsened he will go to the ER. Pt verbalized understanding and was appreciative with this office.

## 2018-01-12 NOTE — TELEPHONE ENCOUNTER
I spoke to patient and Ana Maria and I want his nuclear stress test scheduled for Tuesday AM next week cancelled please. He likely needs LHC by Dr. Kathleen Conway at [facility] who did his PCI in August 2017. I have call into Dr. Kathleen Conway. Thanks.

## 2018-06-25 RX ORDER — RANOLAZINE 1000 MG/1
1000 TABLET, EXTENDED RELEASE ORAL 2 TIMES DAILY
Qty: 180 TABLET | Refills: 3 | Status: SHIPPED | OUTPATIENT
Start: 2018-06-25 | End: 2018-07-10 | Stop reason: SDUPTHER

## 2018-07-10 ENCOUNTER — OFFICE VISIT (OUTPATIENT)
Dept: CARDIOLOGY CLINIC | Age: 63
End: 2018-07-10

## 2018-07-10 VITALS
OXYGEN SATURATION: 97 % | SYSTOLIC BLOOD PRESSURE: 100 MMHG | BODY MASS INDEX: 26.22 KG/M2 | WEIGHT: 177 LBS | HEIGHT: 69 IN | DIASTOLIC BLOOD PRESSURE: 70 MMHG | HEART RATE: 62 BPM

## 2018-07-10 DIAGNOSIS — I10 ESSENTIAL HYPERTENSION, BENIGN: ICD-10-CM

## 2018-07-10 DIAGNOSIS — I25.10 CORONARY ARTERY DISEASE INVOLVING NATIVE CORONARY ARTERY OF NATIVE HEART WITHOUT ANGINA PECTORIS: Primary | ICD-10-CM

## 2018-07-10 DIAGNOSIS — E78.00 PURE HYPERCHOLESTEROLEMIA: ICD-10-CM

## 2018-07-10 PROCEDURE — 99214 OFFICE O/P EST MOD 30 MIN: CPT | Performed by: INTERNAL MEDICINE

## 2018-07-10 RX ORDER — CLOPIDOGREL BISULFATE 75 MG/1
75 TABLET ORAL DAILY
Qty: 60 TABLET | Refills: 5 | Status: SHIPPED | OUTPATIENT
Start: 2018-07-10 | End: 2018-10-30 | Stop reason: SDUPTHER

## 2018-07-10 RX ORDER — LISINOPRIL 5 MG/1
5 TABLET ORAL NIGHTLY
Qty: 30 TABLET | Refills: 3 | Status: SHIPPED | OUTPATIENT
Start: 2018-07-10 | End: 2019-02-25 | Stop reason: SDUPTHER

## 2018-07-10 RX ORDER — RANOLAZINE 1000 MG/1
1000 TABLET, EXTENDED RELEASE ORAL 2 TIMES DAILY
Qty: 180 TABLET | Refills: 3 | Status: SHIPPED | OUTPATIENT
Start: 2018-07-10 | End: 2019-02-25 | Stop reason: SDUPTHER

## 2018-07-10 RX ORDER — ISOSORBIDE MONONITRATE 60 MG/1
60 TABLET, EXTENDED RELEASE ORAL 2 TIMES DAILY
Qty: 60 TABLET | Refills: 5 | Status: SHIPPED | OUTPATIENT
Start: 2018-07-10 | End: 2019-02-25 | Stop reason: SDUPTHER

## 2018-07-10 RX ORDER — NIACIN 500 MG/1
500 TABLET, EXTENDED RELEASE ORAL NIGHTLY
Qty: 30 TABLET | Refills: 3 | Status: SHIPPED | OUTPATIENT
Start: 2018-07-10 | End: 2019-02-25 | Stop reason: SDUPTHER

## 2018-07-10 RX ORDER — ATORVASTATIN CALCIUM 80 MG/1
80 TABLET, FILM COATED ORAL NIGHTLY
Qty: 30 TABLET | Refills: 3 | Status: SHIPPED | OUTPATIENT
Start: 2018-07-10 | End: 2019-02-25 | Stop reason: SDUPTHER

## 2018-07-10 NOTE — LETTER
clinically at this time. Can consider if you begin to have chest pain   3. Lipid and LFT blood work - Fasting  4. Follow up with me in 6 months       If you have questions, please do not hesitate to call me. I look forward to following Migel Medrano along with you.     Sincerely,        Genna Henry MD

## 2018-10-09 ENCOUNTER — HOSPITAL ENCOUNTER (OUTPATIENT)
Dept: CARDIOLOGY | Age: 63
Discharge: HOME | End: 2018-10-09
Payer: COMMERCIAL

## 2018-10-09 VITALS — HEIGHT: 69 IN | WEIGHT: 177 LBS | BODY MASS INDEX: 26.22 KG/M2

## 2018-10-09 DIAGNOSIS — R07.2 PRECORDIAL PAIN: ICD-10-CM

## 2018-10-09 LAB
LV EF: 71 %
LVEF MODALITY: NORMAL

## 2018-10-09 PROCEDURE — 6360000002 HC RX W HCPCS: Performed by: INTERNAL MEDICINE

## 2018-10-09 PROCEDURE — A9502 TC99M TETROFOSMIN: HCPCS | Performed by: INTERNAL MEDICINE

## 2018-10-09 PROCEDURE — 3430000000 HC RX DIAGNOSTIC RADIOPHARMACEUTICAL: Performed by: INTERNAL MEDICINE

## 2018-10-09 PROCEDURE — 78452 HT MUSCLE IMAGE SPECT MULT: CPT

## 2018-10-09 PROCEDURE — 93017 CV STRESS TEST TRACING ONLY: CPT

## 2018-10-09 RX ADMIN — TETROFOSMIN 32.8 MILLICURIE: 0.23 INJECTION, POWDER, LYOPHILIZED, FOR SOLUTION INTRAVENOUS at 09:35

## 2018-10-09 RX ADMIN — REGADENOSON 0.4 MG: 0.08 INJECTION, SOLUTION INTRAVENOUS at 09:35

## 2018-10-09 RX ADMIN — TETROFOSMIN 11.3 MILLICURIE: 0.23 INJECTION, POWDER, LYOPHILIZED, FOR SOLUTION INTRAVENOUS at 08:40

## 2018-10-15 ENCOUNTER — TELEPHONE (OUTPATIENT)
Dept: CARDIOLOGY CLINIC | Age: 63
End: 2018-10-15

## 2018-10-15 NOTE — TELEPHONE ENCOUNTER
----- Message from Osbaldo Rust MD sent at 10/11/2018  4:48 PM EDT -----  Please arrange EECP for this patient with chronic chest pain and diffuse, severe CAD s/p multiple stents. Please call him to let him know what is involved as well. Thank you.

## 2018-10-30 RX ORDER — CLOPIDOGREL BISULFATE 75 MG/1
75 TABLET ORAL DAILY
Qty: 60 TABLET | Refills: 5 | Status: SHIPPED | OUTPATIENT
Start: 2018-10-30 | End: 2019-02-25 | Stop reason: SDUPTHER

## 2018-11-06 ENCOUNTER — TELEPHONE (OUTPATIENT)
Dept: CARDIOLOGY CLINIC | Age: 63
End: 2018-11-06

## 2018-11-15 ENCOUNTER — TELEPHONE (OUTPATIENT)
Dept: CARDIOLOGY CLINIC | Age: 63
End: 2018-11-15

## 2018-11-28 ENCOUNTER — TELEPHONE (OUTPATIENT)
Dept: CARDIOLOGY CLINIC | Age: 63
End: 2018-11-28

## 2018-11-28 NOTE — TELEPHONE ENCOUNTER
Pt calling to inquire about the status of FMLA paperwork he dropped off at [facility] and frank faxed to [facility] office 11/06. Please advise.

## 2019-02-25 ENCOUNTER — OFFICE VISIT (OUTPATIENT)
Dept: CARDIOLOGY CLINIC | Age: 64
End: 2019-02-25
Payer: COMMERCIAL

## 2019-02-25 VITALS
BODY MASS INDEX: 26.36 KG/M2 | DIASTOLIC BLOOD PRESSURE: 64 MMHG | HEIGHT: 69 IN | SYSTOLIC BLOOD PRESSURE: 110 MMHG | OXYGEN SATURATION: 97 % | WEIGHT: 178 LBS | HEART RATE: 72 BPM

## 2019-02-25 DIAGNOSIS — E78.00 PURE HYPERCHOLESTEROLEMIA: ICD-10-CM

## 2019-02-25 DIAGNOSIS — I25.10 CORONARY ARTERY DISEASE INVOLVING NATIVE CORONARY ARTERY OF NATIVE HEART WITHOUT ANGINA PECTORIS: Primary | ICD-10-CM

## 2019-02-25 DIAGNOSIS — I50.32 CHRONIC DIASTOLIC CONGESTIVE HEART FAILURE (HCC): ICD-10-CM

## 2019-02-25 DIAGNOSIS — I10 ESSENTIAL HYPERTENSION, BENIGN: ICD-10-CM

## 2019-02-25 PROCEDURE — 99214 OFFICE O/P EST MOD 30 MIN: CPT | Performed by: INTERNAL MEDICINE

## 2019-02-25 RX ORDER — ISOSORBIDE MONONITRATE 60 MG/1
60 TABLET, EXTENDED RELEASE ORAL 2 TIMES DAILY
Qty: 180 TABLET | Refills: 3 | Status: SHIPPED | OUTPATIENT
Start: 2019-02-25 | End: 2019-08-06 | Stop reason: SDUPTHER

## 2019-02-25 RX ORDER — NIACIN 500 MG/1
500 TABLET, EXTENDED RELEASE ORAL NIGHTLY
Qty: 90 TABLET | Refills: 3 | Status: SHIPPED | OUTPATIENT
Start: 2019-02-25

## 2019-02-25 RX ORDER — LISINOPRIL 5 MG/1
5 TABLET ORAL NIGHTLY
Qty: 90 TABLET | Refills: 3 | Status: SHIPPED | OUTPATIENT
Start: 2019-02-25 | End: 2019-08-06 | Stop reason: SDUPTHER

## 2019-02-25 RX ORDER — ATORVASTATIN CALCIUM 80 MG/1
80 TABLET, FILM COATED ORAL NIGHTLY
Qty: 90 TABLET | Refills: 3 | Status: SHIPPED | OUTPATIENT
Start: 2019-02-25 | End: 2019-08-06 | Stop reason: SDUPTHER

## 2019-02-25 RX ORDER — RANOLAZINE 1000 MG/1
1000 TABLET, EXTENDED RELEASE ORAL 2 TIMES DAILY
Qty: 60 TABLET | Refills: 11 | Status: SHIPPED | OUTPATIENT
Start: 2019-02-25 | End: 2019-08-06 | Stop reason: SDUPTHER

## 2019-02-25 RX ORDER — CLOPIDOGREL BISULFATE 75 MG/1
75 TABLET ORAL DAILY
Qty: 180 TABLET | Refills: 3 | Status: SHIPPED | OUTPATIENT
Start: 2019-02-25 | End: 2019-08-06 | Stop reason: SDUPTHER

## 2019-04-04 ENCOUNTER — TELEPHONE (OUTPATIENT)
Dept: CARDIOLOGY CLINIC | Age: 64
End: 2019-04-04

## 2019-04-04 NOTE — TELEPHONE ENCOUNTER
Please call him and let him know that I am happy his EECP is going well. Please keep me in the loop and see if CP symptoms improve. Let him know that I signed paperwork for his program as well. Thanks.

## 2019-04-04 NOTE — TELEPHONE ENCOUNTER
Pt sent email to inform Dr. Rose Flores':          Chloe  Dr. Rose Flores,     I wanted to let you know that I began EECP treatment on March 25th at [facility] on [address]. Today I received treatment 8 of 35. All is well with this and I am feeling fine. I will keep you in the loop with my progress. Thank you for all that you do for my care.    Vick Triana

## 2019-05-13 ENCOUNTER — TELEPHONE (OUTPATIENT)
Dept: CARDIOLOGY CLINIC | Age: 64
End: 2019-05-13

## 2019-05-13 NOTE — TELEPHONE ENCOUNTER
EECP is usually three months as far as I know,   if he has completed it then have Dr Lisa Petit review the chart before reordering it.

## 2019-05-13 NOTE — TELEPHONE ENCOUNTER
Last ov w/ Dr. Denisse Rolon 02/25/2019:  Assessment:      1. CAD (coronary artery disease):  Due to exertional CP and SOB symptoms I referred him for [facility] on 3/17/17 which showed multi-vessel CAD. Referred for surgical revascularization and on 3/21/17 he underwent 4V CABG by Dr. Carola Mckeon with LIMA to LAD and reverse SVG to ramus OM distal to the stent and PDA. Most recent LHC at [facility] per Dr. Vasile Martini on 1/20/18 which showed diffuse disease but nothing amenable to PCI and  EECP therapy discussed. He starts program at [facility] late March this year. There are no concerning symptoms for unstable angina. He has low-level pain without change improved with ranexa and imdur. .        2. Essential hypertension, benign:  Well controlled and will continue current medical regimen.      3. Hyperlipidemia: I personally reviewed most recent labs from 7/10/18  Well controlled and at goal and will continue current medical regimen.     Plan:  1. Continue taking all medications as prescribed. Refills given for 1 yr   2. Stable and will continue present medical regimen. OK to start EECP. 3. Healthy lifestyle education reviewed including nutrition, controlling BP, lipids, exercise  activity, weight loss and medication compliance advised  4. Will check cmp, lipids at PCP office later this week.   5. Follow up with me in 6 months

## 2019-05-13 NOTE — TELEPHONE ENCOUNTER
Piper @ [facility] called and is inquiring about extending treatments for pt [address]  51. Pt last treatment is today and Piper who is their EECP wants to extend them another 10-15 more. Please advise, thank you. Piper contact # [phone] /  Cell # [phone]. She would like an answer today please.

## 2019-08-06 ENCOUNTER — OFFICE VISIT (OUTPATIENT)
Dept: CARDIOLOGY CLINIC | Age: 64
End: 2019-08-06
Payer: COMMERCIAL

## 2019-08-06 VITALS
OXYGEN SATURATION: 94 % | SYSTOLIC BLOOD PRESSURE: 110 MMHG | HEART RATE: 64 BPM | BODY MASS INDEX: 27.49 KG/M2 | HEIGHT: 69 IN | DIASTOLIC BLOOD PRESSURE: 64 MMHG | WEIGHT: 185.6 LBS

## 2019-08-06 DIAGNOSIS — R07.9 CHEST PAIN, UNSPECIFIED TYPE: Primary | ICD-10-CM

## 2019-08-06 DIAGNOSIS — I25.118 CORONARY ARTERY DISEASE OF NATIVE ARTERY OF NATIVE HEART WITH STABLE ANGINA PECTORIS (HCC): ICD-10-CM

## 2019-08-06 DIAGNOSIS — R06.02 SOB (SHORTNESS OF BREATH): ICD-10-CM

## 2019-08-06 DIAGNOSIS — I10 ESSENTIAL HYPERTENSION, BENIGN: ICD-10-CM

## 2019-08-06 PROCEDURE — 93000 ELECTROCARDIOGRAM COMPLETE: CPT | Performed by: INTERNAL MEDICINE

## 2019-08-06 PROCEDURE — 99215 OFFICE O/P EST HI 40 MIN: CPT | Performed by: INTERNAL MEDICINE

## 2019-08-06 RX ORDER — SOY ISOFLAVONE 40 MG
500 TABLET ORAL 2 TIMES DAILY
Qty: 60 CAPSULE | Refills: 5 | Status: SHIPPED | OUTPATIENT
Start: 2019-08-06 | End: 2021-07-19 | Stop reason: SDUPTHER

## 2019-08-06 RX ORDER — ISOSORBIDE MONONITRATE 60 MG/1
60 TABLET, EXTENDED RELEASE ORAL 2 TIMES DAILY
Qty: 180 TABLET | Refills: 3 | Status: SHIPPED | OUTPATIENT
Start: 2019-08-06 | End: 2020-09-23

## 2019-08-06 RX ORDER — CLOPIDOGREL BISULFATE 75 MG/1
75 TABLET ORAL DAILY
Qty: 180 TABLET | Refills: 3 | Status: SHIPPED | OUTPATIENT
Start: 2019-08-06 | End: 2019-12-17 | Stop reason: SDUPTHER

## 2019-08-06 RX ORDER — RANOLAZINE 1000 MG/1
1000 TABLET, EXTENDED RELEASE ORAL 2 TIMES DAILY
Qty: 60 TABLET | Refills: 11 | Status: SHIPPED | OUTPATIENT
Start: 2019-08-06 | End: 2020-09-04

## 2019-08-06 RX ORDER — ATORVASTATIN CALCIUM 80 MG/1
80 TABLET, FILM COATED ORAL NIGHTLY
Qty: 90 TABLET | Refills: 3 | Status: SHIPPED | OUTPATIENT
Start: 2019-08-06 | End: 2020-08-19

## 2019-08-06 RX ORDER — LISINOPRIL 5 MG/1
5 TABLET ORAL NIGHTLY
Qty: 90 TABLET | Refills: 3 | Status: SHIPPED | OUTPATIENT
Start: 2019-08-06 | End: 2020-09-04

## 2019-08-06 RX ORDER — FUROSEMIDE 40 MG/1
40 TABLET ORAL DAILY
Qty: 30 TABLET | Refills: 3 | Status: SHIPPED | OUTPATIENT
Start: 2019-08-06 | End: 2019-10-29 | Stop reason: SDUPTHER

## 2019-08-06 NOTE — LETTER
nitroGLYCERIN (NITROSTAT) 0.4 MG SL tablet Place 1 tablet under the tongue every 5 minutes as needed for Chest pain. 9/19/11 9/18/12  Tej Bae MD        Allergies:  Patient has no known allergies. Review of Systems:   · Constitutional: there has been no unanticipated weight loss. There's been no change in energy level, sleep pattern, or activity level. · Eyes: No visual changes or diplopia. No scleral icterus. · ENT: No Headaches, hearing loss or vertigo. No mouth sores or sore throat. · Cardiovascular: Reviewed in HPI  · Respiratory: No cough or wheezing, no sputum production. No hematemesis. · Gastrointestinal: No abdominal pain, appetite loss, blood in stools. No change in bowel or bladder habits. · Genitourinary: No dysuria, trouble voiding, or hematuria. · Musculoskeletal:  No gait disturbance, weakness or joint complaints. · Integumentary: No rash or pruritis. · Neurological: No headache, diplopia, change in muscle strength, numbness or tingling. No change in gait, balance, coordination, mood, affect, memory, mentation, behavior. · Psychiatric: No anxiety, no depression. · Endocrine: No malaise, fatigue or temperature intolerance. No excessive thirst, fluid intake, or urination. No tremor. · Hematologic/Lymphatic: No abnormal bruising or bleeding, blood clots or swollen lymph nodes. · Allergic/Immunologic: No nasal congestion or hives.     Physical Examination:    Vitals:    08/06/19 0951   BP: 110/64   Pulse: 64   SpO2: 94%        Wt Readings from Last 3 Encounters:   08/06/19 185 lb 9.6 oz (84.2 kg)   02/25/19 178 lb (80.7 kg)   10/09/18 177 lb (80.3 kg)     Constitutional and General Appearance: NAD   Respiratory:  · Normal excursion and expansion without use of accessory muscles  · Resp Auscultation: Normal breath sounds without dullness  Cardiovascular:  · The apical impulses not displaced  · Heart tones are crisp and normal  · Cervical veins are not engorged · The carotid upstroke is normal in amplitude and contour without delay or bruit  · Normal S1S2, No S3, Soft NANI  · Peripheral pulses are symmetrical and full  · There is no clubbing, cyanosis of the extremities. · 2+ RLE edema 1+ LLE edema   · Femoral Arteries: 2+ and equal  · Pedal Pulses: 2+ and equal   Abdomen:  · No masses or tenderness  · Liver/Spleen: No Abnormalities Noted  Neurological/Psychiatric:  · Alert and oriented in all spheres  · Moves all extremities well  · Exhibits normal gait balance and coordination  · No abnormalities of mood, affect, memory, mentation, or behavior are noted    GXT stress Test 7/20/17   ABNORMAL HIGH risk stress test  Normal LV size and systolic function. Left ventricular ejection fraction of  71%. Normal wall motion. There is a large severe defect in the anterior and  anterolateral walls at stress that completely reveres with rest consistent  with ischemia. Lab Results   Component Value Date    CHOL 152 07/10/2018    CHOL 97 07/10/2018    CHOL 156 07/05/2017     Lab Results   Component Value Date    TRIG 160 (H) 07/10/2018    TRIG 152 (H) 07/05/2017    TRIG 129 03/21/2017     Lab Results   Component Value Date    HDL 55 07/10/2018    HDL 52 07/05/2017    HDL 63 (H) 03/21/2017     Lab Results   Component Value Date    LDLCALC 65 07/10/2018    LDLCALC 74 07/05/2017    LDLCALC 59 03/21/2017     Lab Results   Component Value Date    LABVLDL 30 07/05/2017    LABVLDL 26 03/21/2017    LABVLDL 12 03/17/2017 6/2018 NAb 137, BUN/Creat 22/0.96, Pot 5.0    Assessment:     1. CAD (coronary artery disease):  Due to exertional CP and SOB symptoms I referred him for LHC on 3/17/17 which showed multi-vessel CAD. Referred for surgical revascularization and on 3/21/17 he underwent 4V CABG by Dr. Ander Thomas with LIMA to LAD and reverse SVG to ramus OM distal to the stent and PDA.               Most recent LHC at [facility] per Dr. Nona Wiseman on 1/20/18 which showed

## 2019-08-09 ENCOUNTER — TELEPHONE (OUTPATIENT)
Dept: CARDIOLOGY CLINIC | Age: 64
End: 2019-08-09

## 2019-08-09 NOTE — TELEPHONE ENCOUNTER
Patient was called and message given per RKG.
Pt called and states that SMM had started him on Lasix on 8-6-19 and the pt says that there has been no changes in his urination (no more or less?) but the pt states that he had some pitting edema last night. Pt is wondering if he needs to continue the original dosing for the 3 days as directed or if the Lasix needs to be increased? Please advise, thank you.  Pt contatc # [phone]
Take lasix 40 mg daily  Please order BMP to be done on 8.22.19  Please have patient monitor his weight daily dont just go by the how much he urinates. He is to have cardiac testing according to Dr Bart Bey notes.
Detail Level: Simple
Detail Level: Zone

## 2019-08-19 ENCOUNTER — TELEPHONE (OUTPATIENT)
Dept: CARDIOLOGY CLINIC | Age: 64
End: 2019-08-19

## 2019-08-22 ENCOUNTER — HOSPITAL ENCOUNTER (OUTPATIENT)
Dept: CARDIOLOGY | Age: 64
Discharge: HOME | End: 2019-08-22
Payer: COMMERCIAL

## 2019-08-22 ENCOUNTER — HOSPITAL ENCOUNTER (OUTPATIENT)
Age: 64
Discharge: HOME | End: 2019-08-22
Payer: COMMERCIAL

## 2019-08-22 VITALS — HEIGHT: 69 IN | BODY MASS INDEX: 27.4 KG/M2 | WEIGHT: 185 LBS

## 2019-08-22 DIAGNOSIS — I10 ESSENTIAL HYPERTENSION, BENIGN: ICD-10-CM

## 2019-08-22 DIAGNOSIS — R07.9 CHEST PAIN, UNSPECIFIED TYPE: ICD-10-CM

## 2019-08-22 DIAGNOSIS — I25.118 CORONARY ARTERY DISEASE OF NATIVE ARTERY OF NATIVE HEART WITH STABLE ANGINA PECTORIS (HCC): ICD-10-CM

## 2019-08-22 DIAGNOSIS — R06.02 SOB (SHORTNESS OF BREATH): ICD-10-CM

## 2019-08-22 LAB
A/G RATIO: 1.5 (ref 1.1–2.2)
ALBUMIN SERPL-MCNC: 4.2 G/DL (ref 3.4–5)
ALP BLD-CCNC: 51 U/L (ref 40–129)
ALT SERPL-CCNC: 20 U/L (ref 10–40)
ANION GAP SERPL CALCULATED.3IONS-SCNC: 13 MMOL/L (ref 3–16)
AST SERPL-CCNC: 23 U/L (ref 15–37)
BILIRUB SERPL-MCNC: 0.6 MG/DL (ref 0–1)
BUN BLDV-MCNC: 24 MG/DL (ref 7–20)
CALCIUM SERPL-MCNC: 9.1 MG/DL (ref 8.3–10.6)
CHLORIDE BLD-SCNC: 100 MMOL/L (ref 99–110)
CHOLESTEROL, TOTAL: 131 MG/DL (ref 0–199)
CO2: 24 MMOL/L (ref 21–32)
CREAT SERPL-MCNC: 0.9 MG/DL (ref 0.8–1.3)
GFR AFRICAN AMERICAN: >60
GFR NON-AFRICAN AMERICAN: >60
GLOBULIN: 2.8 G/DL
GLUCOSE BLD-MCNC: 104 MG/DL (ref 70–99)
HCT VFR BLD CALC: 43.4 % (ref 40.5–52.5)
HDLC SERPL-MCNC: 56 MG/DL (ref 40–60)
HEMOGLOBIN: 15 G/DL (ref 13.5–17.5)
LDL CHOLESTEROL CALCULATED: 47 MG/DL
LV EF: 55 %
LV EF: 73 %
LVEF MODALITY: NORMAL
LVEF MODALITY: NORMAL
MCH RBC QN AUTO: 35.3 PG (ref 26–34)
MCHC RBC AUTO-ENTMCNC: 34.5 G/DL (ref 31–36)
MCV RBC AUTO: 102.4 FL (ref 80–100)
PDW BLD-RTO: 13.1 % (ref 12.4–15.4)
PLATELET # BLD: 168 K/UL (ref 135–450)
PMV BLD AUTO: 7.9 FL (ref 5–10.5)
POTASSIUM SERPL-SCNC: 5.3 MMOL/L (ref 3.5–5.1)
RBC # BLD: 4.23 M/UL (ref 4.2–5.9)
SODIUM BLD-SCNC: 137 MMOL/L (ref 136–145)
TOTAL PROTEIN: 7 G/DL (ref 6.4–8.2)
TRIGL SERPL-MCNC: 142 MG/DL (ref 0–150)
VLDLC SERPL CALC-MCNC: 28 MG/DL
WBC # BLD: 6.7 K/UL (ref 4–11)

## 2019-08-22 PROCEDURE — 80061 LIPID PANEL: CPT

## 2019-08-22 PROCEDURE — 80053 COMPREHEN METABOLIC PANEL: CPT

## 2019-08-22 PROCEDURE — 78452 HT MUSCLE IMAGE SPECT MULT: CPT

## 2019-08-22 PROCEDURE — 93017 CV STRESS TEST TRACING ONLY: CPT

## 2019-08-22 PROCEDURE — 85027 COMPLETE CBC AUTOMATED: CPT

## 2019-08-22 PROCEDURE — 3430000000 HC RX DIAGNOSTIC RADIOPHARMACEUTICAL: Performed by: INTERNAL MEDICINE

## 2019-08-22 PROCEDURE — 6360000002 HC RX W HCPCS: Performed by: INTERNAL MEDICINE

## 2019-08-22 PROCEDURE — 36415 COLL VENOUS BLD VENIPUNCTURE: CPT

## 2019-08-22 PROCEDURE — A9502 TC99M TETROFOSMIN: HCPCS | Performed by: INTERNAL MEDICINE

## 2019-08-22 PROCEDURE — 93306 TTE W/DOPPLER COMPLETE: CPT

## 2019-08-22 RX ADMIN — TETROFOSMIN 30.4 MILLICURIE: 1.38 INJECTION, POWDER, LYOPHILIZED, FOR SOLUTION INTRAVENOUS at 09:24

## 2019-08-22 RX ADMIN — REGADENOSON 0.4 MG: 0.08 INJECTION, SOLUTION INTRAVENOUS at 09:24

## 2019-08-22 RX ADMIN — TETROFOSMIN 10.8 MILLICURIE: 1.38 INJECTION, POWDER, LYOPHILIZED, FOR SOLUTION INTRAVENOUS at 08:30

## 2019-08-23 ENCOUNTER — TELEPHONE (OUTPATIENT)
Dept: CARDIOLOGY CLINIC | Age: 64
End: 2019-08-23

## 2019-08-23 NOTE — TELEPHONE ENCOUNTER
I informed pt I would let Dr. Belen Alejandro know results & back for him to read. Pt is very nervous & would like Dr. Belen Alejandro himself call w/ results since Dr. Juan C Oconnor is [address] [place] [address]. Thanks!

## 2019-08-23 NOTE — TELEPHONE ENCOUNTER
I called patient and informed him of his abnormal stress test.  He wanted me to refer him today to Dr Carl Kidd for angiogram.  I thought it may be best if Dr Juan C Oconnor reviewed it himself and then guided him thru. Dr Juan C Oconnor is back to work on 8.26.19. Patient is on adequate medical therapy.

## 2019-08-26 NOTE — TELEPHONE ENCOUNTER
I contacted Dr. Silvia Huston and patient personally by phone. Dr. Silvia Huston will call patient to schedule another [facility] given abnormal nuc stress and increased CP complaints. I informed Mr. Fonsecadenis Espinoza of the plan and he is glad I got ahold of Dr. Silvia Huston so quickly.

## 2019-08-27 RX ORDER — METOPROLOL TARTRATE 50 MG/1
50 TABLET, FILM COATED ORAL 2 TIMES DAILY
Qty: 60 TABLET | Refills: 11 | Status: SHIPPED | OUTPATIENT
Start: 2019-08-27 | End: 2021-07-19 | Stop reason: SDUPTHER

## 2019-09-24 ENCOUNTER — TELEPHONE (OUTPATIENT)
Dept: CARDIOLOGY CLINIC | Age: 64
End: 2019-09-24

## 2019-10-29 RX ORDER — FUROSEMIDE 40 MG/1
40 TABLET ORAL DAILY
Qty: 30 TABLET | Refills: 11 | Status: SHIPPED | OUTPATIENT
Start: 2019-10-29 | End: 2020-06-02 | Stop reason: SDUPTHER

## 2019-12-17 RX ORDER — CLOPIDOGREL BISULFATE 75 MG/1
75 TABLET ORAL DAILY
Qty: 180 TABLET | Refills: 3 | Status: SHIPPED | OUTPATIENT
Start: 2019-12-17 | End: 2020-12-07

## 2019-12-19 ENCOUNTER — TELEPHONE (OUTPATIENT)
Dept: CARDIOLOGY CLINIC | Age: 64
End: 2019-12-19

## 2020-06-02 ENCOUNTER — OFFICE VISIT (OUTPATIENT)
Dept: CARDIOLOGY CLINIC | Age: 65
End: 2020-06-02
Payer: COMMERCIAL

## 2020-06-02 VITALS
HEART RATE: 61 BPM | OXYGEN SATURATION: 95 % | BODY MASS INDEX: 27.7 KG/M2 | WEIGHT: 187.6 LBS | DIASTOLIC BLOOD PRESSURE: 60 MMHG | SYSTOLIC BLOOD PRESSURE: 118 MMHG

## 2020-06-02 PROBLEM — M79.89 SWELLING OF LOWER EXTREMITY: Status: ACTIVE | Noted: 2020-06-02

## 2020-06-02 PROCEDURE — 99214 OFFICE O/P EST MOD 30 MIN: CPT | Performed by: INTERNAL MEDICINE

## 2020-06-02 RX ORDER — FUROSEMIDE 40 MG/1
40 TABLET ORAL 2 TIMES DAILY
Qty: 60 TABLET | Refills: 5 | Status: SHIPPED | OUTPATIENT
Start: 2020-06-02 | End: 2020-10-19

## 2020-06-02 RX ORDER — FAMOTIDINE 40 MG/1
40 TABLET, FILM COATED ORAL 2 TIMES DAILY
COMMUNITY

## 2020-06-02 NOTE — PROGRESS NOTES
[address]   Cardiac Followup    Referring Provider:  Deloris Ravi     Chief Complaint   Patient presents with    1 Year Follow Up    Coronary Artery Disease    Hypertension    Hyperlipidemia      Subjective: Mr Patricia Miles presents today for cardiology follow up of CAD s/p stents, hx CABG, HTN, HLD; no complaints today    History of Present Illness:    Mr. Patricia Miles is a 69yo male with hx of CABG. He also has history of CAD, HTN, HLD, and mild AS. He is s/p LAD and OM stent with PTCA only of the diagonal branch in 2003. On 5/12/09 he underwent repeat intervention with ALLYN of the OM branch overlapping a prior placed stent in 2003. Note cath on 9/30/11 for c/o new-onset exertional chest pain during office visit 9/23/11. Cath showed widely patent stents previously placed to LAD stent and obtuse marginal branches (no change from 5/09 study). Due to exertional CP and SOB symptoms I referred him for [facility] on 3/17/17 which showed 70-80% LAD, 99% D1, 70% OM1, 90% OM2, 100% mid RCA; normal LVEF; normal hemodynamics. Referred to CT surgery and on 3/21/17 he underwent 4V CABG by Dr. Radha Woodard with LIMA to LAD and reverse SVG to ramus OM distal to the stent and PDA. Most recent  carotid Duplex showed 3/20/17 showed minimal plaque <50% bilaterally. Note ECHO on 3/20/17 showed normal EF=60-65%; Grade I DD with normal filling pressure. Mild AS (AV velocity=2.23m/s with mean pressure gradient=10mmHg). Note stress test 7/20/17 ABNORMAL HIGH risk stress test; LVEF of  71%. There is a large severe defect in the anterior and  anterolateral walls at stress that completely reversible with rest c/w with ischemia. He underwent RHC, LHC, LVG on 7/5/17 showing severe disease left and right coronary systems with patent LIMA graft but disease of remaining grafts and one occluded. Subsequently, he underwent LHC 8/7/17 PCI Complex LAD, diag Rt groin and on again 8/9/17 had PCI of complex circumflex, RCA  with Dr. Elliot Snowden at [facility]. TRIG 152 (H) 07/05/2017     Lab Results   Component Value Date    HDL 56 08/22/2019    HDL 55 07/10/2018    HDL 52 07/05/2017     Lab Results   Component Value Date    LDLCALC 47 08/22/2019    LDLCALC 65 07/10/2018    LDLCALC 74 07/05/2017     Lab Results   Component Value Date    LABVLDL 28 08/22/2019    LABVLDL 30 07/05/2017    LABVLDL 26 03/21/2017       Labs: 4/2020 BUN/Cr 19/0.98 NA/K 138/2.5 AST/ALT 31/21. , HDL 55, LDL 64,     Assessment:     1. CAD (coronary artery disease):  Due to exertional CP and SOB symptoms I referred him for [facility] on 3/17/17 which showed multi-vessel CAD. Referred for surgical revascularization and on 3/21/17 he underwent 4V CABG by Dr. Lou Favre with LIMA to LAD and reverse SVG to ramus OM distal to the stent and PDA. Most recent [facility] on 9/18/2019 that showed Severe diffuse disease but patent LIMA to LAD, SVG to small OM, and patent stents to LAD, OM, and RCA. No significant change when compared to previous films No need for PCI. He feels much better and chronic angina improved with only occasional use of SL PRN NTG. There are no concerning symptoms for angina currently. 2. Essential hypertension, benign:  Well controlled and will continue current medical regimen. 3.  Hyperlipidemia: I personally reviewed most recent labs from 4/2020 Well controlled and at goal and will continue current medical regimen. 4. Lower extremity edema: Possibly venous insufficiency. Will check LE dopplers for possible chronic DVT and increase lasix dosing. Plan:  1. VL dup venous lower extremity bilateral due to swelling. 2. Increase Lasix to 40 mg twice daily due to swelling. 3. Recheck BMP in 1 week   4. Continue all other medications as prescribed. 5. Consider repeat ECHO 2-3 years to check mild AS. Follow up with me in 6 months. Cost of prescription medications and patient compliance have been reviewed with patient. All questions answered.      This note was scribed

## 2020-06-02 NOTE — LETTER
Dr. Kendra Sun at [facility]. Most recent Limited ECHO 0/7/21 showed LV systolic function was normal. He states his Plavix has been increased to 150 mg daily by Dr. Ofelia Desai at [facility] (he went for 2nd opinion) based on genetic testing results. His blood work showed systemic inflammation with elevated CRP,  lipoprotein A, ADP response 42,.indidicating a lack of full response. At that time plavix and aspirin dosing was increased and niacin added and imdur dose increased. Note LHC at [facility] per Dr. Kendra Sun on 1/20/18 which showed LAD: Mid-vessel lesion 50% in-stent restenosis, Proximal vessel lesion: 70% stenosis, 1st diagonal: Proximal vessel lesion: 100% in-stent stenosis, Right coronary: Mid-vessel [id number]% stenosis; patent LIMA-LAD; patent small SVG-small distal RCA branch; patent SVG to the lateral 1st obtuse marginal, from the aorta: Proximal anastomosis lesion: 60% stenosis, Distal anastomosis lesion: 60% stenosis. No PCI placed. EECP done afterwards. Lexiscan myoview stress test 10/9/18 small to moderate sized area of ischemia within the mid anterior wall. EF 71%. Note felt significant and doing better clinically so followed medically only without cath. His most recent [address] from 8/22/2019 was abnormal, moderate risk showing moderate sized area of mild ischemia within the anterior-lateral wall (normal rebecca study 11/13). He f/u Dr. Kendra Sun and underwent a most recent [facility] on 9/18/2019 that showed Severe diffuse disease but patent LIMA to LAD, SVG to small OM, and patent stents to LAD, OM, and RCA. No significant change when compared to previous films No need for PCI. Most recent Echo from 8/22/2019 showed EF=55% with mild aortic stenosis with a max velocity of 2.41 m/sec and a mean pressure gradient of 14 mmHg (no change from 3/17 study). Today he reports that he has been feeling well since his last visit.  He completed EECP therapy last year and feels this has improved his symptoms. He has been taking his medications as prescribed and is tolerating them well. His chronic angina has improved in the last few months. He has had no radiating pain. He continues to get occasional tightening in his chest but only uses occasional SL NTG and feels better overall. He admits to lower extremity swelling though does not wear SWATI hose. Patient currently denies any weight gain, palpitations, dizziness, and syncope. Past Medical History   has a past medical history of CAD (coronary artery disease), Family history of early CAD, Hyperlipidemia, Hypertension, and S/P coronary artery stent placement. Surgical History:   has a past surgical history that includes Coronary angioplasty with stent; Cardiac catheterization (09/26/2011); Coronary artery bypass graft (03/21/2017); Cardiac catheterization (03/17/2017); Coronary angioplasty with stent (05/12/2009); Coronary angioplasty with stent (08/15/2003); and Coronary angioplasty with stent. Social History:   reports that he quit smoking about 27 years ago. His smoking use included cigarettes. He has a 12.00 pack-year smoking history. He has never used smokeless tobacco. He reports current alcohol use. He reports that he does not use drugs. Teaches Croatian. Hopes to retire in 4 years. Family History:  family history is not on file. Home Medications:  Prior to Admission medications    Medication Sig Start Date End Date Taking? Authorizing Provider   naproxen (NAPROXEN) 500 MG EC tablet Take 500 mg by mouth 2 times daily (with meals). Prn      Yes Historical Provider, MD   lisinopril (PRINIVIL;ZESTRIL) 10 MG tablet Take 1 tablet by mouth daily. 9/19/11  Yes Sagar Villegas MD   aspirin 81 MG tablet Take 81 mg by mouth daily. Yes Historical Provider, MD   ranitidine (ZANTAC) 150 MG tablet Take 150 mg by mouth daily as needed.      Yes Historical Provider, MD atorvastatin (LIPITOR) 40 MG tablet Take 40 mg by mouth nightly. Yes Historical Provider, MD   clopidogrel (PLAVIX) 75 MG tablet Take 75 mg by mouth daily. Yes Historical Provider, MD   nitroGLYCERIN (NITROSTAT) 0.4 MG SL tablet Place 1 tablet under the tongue every 5 minutes as needed for Chest pain. 9/19/11 9/18/12  Pro Horton MD        Allergies:  Patient has no known allergies. Review of Systems:   · Constitutional: there has been no unanticipated weight loss. There's been no change in energy level, sleep pattern, or activity level. · Eyes: No visual changes or diplopia. No scleral icterus. · ENT: No Headaches, hearing loss or vertigo. No mouth sores or sore throat. · Cardiovascular: Reviewed in HPI  · Respiratory: No cough or wheezing, no sputum production. No hematemesis. · Gastrointestinal: No abdominal pain, appetite loss, blood in stools. No change in bowel or bladder habits. · Genitourinary: No dysuria, trouble voiding, or hematuria. · Musculoskeletal:  No gait disturbance, weakness or joint complaints. · Integumentary: No rash or pruritis. · Neurological: No headache, diplopia, change in muscle strength, numbness or tingling. No change in gait, balance, coordination, mood, affect, memory, mentation, behavior. · Psychiatric: No anxiety, no depression. · Endocrine: No malaise, fatigue or temperature intolerance. No excessive thirst, fluid intake, or urination. No tremor. · Hematologic/Lymphatic: No abnormal bruising or bleeding, blood clots or swollen lymph nodes. · Allergic/Immunologic: No nasal congestion or hives.     Physical Examination:    Vitals:    06/02/20 0803   BP: 118/60   Pulse: 61   SpO2: 95%        Wt Readings from Last 3 Encounters:   06/02/20 187 lb 9.6 oz (85.1 kg)   08/22/19 185 lb (83.9 kg)   08/06/19 185 lb 9.6 oz (84.2 kg)     Constitutional and General Appearance: NAD   Respiratory:  · Normal excursion and expansion without use of accessory muscles · Resp Auscultation: Normal breath sounds without dullness  Cardiovascular:  · The apical impulses not displaced  · Heart tones are crisp and normal  · Cervical veins are not engorged  · The carotid upstroke is normal in amplitude and contour without delay or bruit  · Normal S1S2, No S3, Soft NANI  · Peripheral pulses are symmetrical and full  · There is no clubbing, cyanosis of the extremities. · Left 1+ edema, 2+ edema on the right  · Femoral Arteries: 2+ and equal  · Pedal Pulses: 2+ and equal   Abdomen:  · No masses or tenderness  · Liver/Spleen: No Abnormalities Noted  Neurological/Psychiatric:  · Alert and oriented in all spheres  · Moves all extremities well  · Exhibits normal gait balance and coordination  · No abnormalities of mood, affect, memory, mentation, or behavior are noted    GXT stress Test 7/20/17   ABNORMAL HIGH risk stress test  Normal LV size and systolic function. Left ventricular ejection fraction of  71%. Normal wall motion. There is a large severe defect in the anterior and  anterolateral walls at stress that completely reveres with rest consistent  with ischemia. Stress test 8/22/2019   Summary  Abnormal moderate risk myocardial perfusion study. There is moderate sized area of mild ischemia within the anterior-lateral  wall. Normal left ventricular size, wall motion, and function. The estimated left ventricular function is 73%. Echo 8/22/2019 Summary   Normal left ventricle systolic function with an estimated EF of 55%. No regional wall motion abnormalities are seen. Normal left ventricular diastolic filling pressure. The aortic valve is thickened/calcified with decreased leaflet mobility. Mild aortic stenosis. Trace aortic regurgitation. Mild to moderate tricuspid regurgitation. Systolic pulmonary artery pressure (SPAP) is normal and estimated at 25 mmHg   (right atrial pressure 3 mmHg).        Lab Results   Component Value Date    CHOL 131 08/22/2019 Cost of prescription medications and patient compliance have been reviewed with patient. All questions answered. This note was scribed in the presence of Dr. Casie Weinberg MD by Dulce Bradford RN.      I, Dr. Casie Weinberg, personally performed the services described in this documentation, as scribed by the above signed scribe in my presence. It is both accurate and complete to my knowledge. I agree with the details independently gathered by the clinical support staff, while the remaining scribed note accurately describes my personal service to the patient. Thank you for allowing me to participate in the care of this individual.      Luis Madison.  Dayanara Muro M.D., [address] [facility]

## 2020-06-09 ENCOUNTER — HOSPITAL ENCOUNTER (OUTPATIENT)
Dept: VASCULAR LAB | Age: 65
Discharge: HOME | End: 2020-06-09
Payer: COMMERCIAL

## 2020-06-09 PROCEDURE — 93970 EXTREMITY STUDY: CPT

## 2020-08-19 RX ORDER — ATORVASTATIN CALCIUM 80 MG/1
80 TABLET, FILM COATED ORAL NIGHTLY
Qty: 90 TABLET | Refills: 1 | Status: SHIPPED | OUTPATIENT
Start: 2020-08-19 | End: 2021-02-25

## 2020-09-04 RX ORDER — RANOLAZINE 1000 MG/1
1000 TABLET, EXTENDED RELEASE ORAL 2 TIMES DAILY
Qty: 60 TABLET | Refills: 11 | Status: SHIPPED | OUTPATIENT
Start: 2020-09-04 | End: 2022-03-22

## 2020-09-04 RX ORDER — LISINOPRIL 5 MG/1
5 TABLET ORAL NIGHTLY
Qty: 90 TABLET | Refills: 3 | Status: SHIPPED | OUTPATIENT
Start: 2020-09-04 | End: 2021-06-01

## 2020-09-23 RX ORDER — ISOSORBIDE MONONITRATE 60 MG/1
60 TABLET, EXTENDED RELEASE ORAL 2 TIMES DAILY
Qty: 180 TABLET | Refills: 3 | Status: SHIPPED | OUTPATIENT
Start: 2020-09-23 | End: 2021-06-24

## 2020-10-19 RX ORDER — FUROSEMIDE 40 MG/1
40 TABLET ORAL 2 TIMES DAILY
Qty: 180 TABLET | Refills: 2 | Status: SHIPPED | OUTPATIENT
Start: 2020-10-19 | End: 2021-07-13

## 2020-12-07 RX ORDER — CLOPIDOGREL BISULFATE 75 MG/1
TABLET ORAL
Qty: 180 TABLET | Refills: 3 | Status: SHIPPED | OUTPATIENT
Start: 2020-12-07 | End: 2021-03-10 | Stop reason: SDUPTHER

## 2020-12-29 NOTE — PROGRESS NOTES
[address]   Cardiac Followup    Referring Provider:  Oli Reeves     Chief Complaint   Patient presents with    Follow-up    Coronary Artery Disease    Hypertension    Edema     lasix helps with leg edema    Chest Pain     comes and goes    Shortness of Breath      Subjective: Mr Liam Salazar presents today for cardiology follow up of CAD s/p stents, hx CABG, HTN, HLD, and lower extremity edema; c/o baseline CLINE and occasional CP    History of Present Illness:    Mr. Liam Salazar is a 68yo male with hx of CAD s/p 4V CABG 3/17, HTN, HLD, and mild AS. He is s/p LAD and OM stent with PTCA only of the diagonal branch in 2003. On 5/12/09 he underwent repeat intervention with ALLYN of the OM branch overlapping a prior placed stent in 2003. Note cath on 9/30/11 for c/o new-onset exertional chest pain during office visit 9/23/11. Cath showed widely patent stents previously placed to LAD stent and obtuse marginal branches (no change from 5/09 study). Due to exertional CP and SOB symptoms I referred him for [facility] on 3/17/17 which showed 70-80% LAD, 99% D1, 70% OM1, 90% OM2, 100% mid RCA; normal LVEF; normal hemodynamics. Referred to CT surgery and on 3/21/17 he underwent 4V CABG by Dr. Slime Carmona with LIMA to LAD and reverse SVG to ramus OM distal to the stent and PDA. Most recent VL carotid Duplex showed 3/20/17 showed minimal plaque <50% bilaterally. He underwent LHC 8/7/17 PCI Complex LAD, diag Rt groin and on again 8/9/17 had PCI of complex circumflex, RCA  with Dr. Matias Hall at [facility]. He states his Plavix has been increased to 150 mg daily by Dr. Javier Hernández at [facility] (he went for 2nd opinion) based on genetic testing results. His blood work showed systemic inflammation with elevated CRP,  lipoprotein A, ADP response 42,.indidicating a lack of full response. At that time plavix and aspirin dosing was increased and niacin added and imdur dose increased.   Note LHC at [facility] per Dr. Matias Hall on 1/20/18 which showed LAD: Mid-vessel lesion 50% in-stent restenosis, Proximal vessel lesion: 70% stenosis, 1st diagonal: Proximal vessel lesion: 100% in-stent stenosis, Right coronary: Mid-vessel [id number]% stenosis; patent LIMA-LAD; patent small SVG-small distal RCA branch; patent SVG to the lateral 1st obtuse marginal, from the aorta: Proximal anastomosis lesion: 60% stenosis, Distal anastomosis lesion: 60% stenosis. No PCI placed. EECP done afterwards. Lexiscan myoview stress test 10/9/18 small to moderate sized area of ischemia within the mid anterior wall. EF 71%. Note felt significant and doing better clinically so followed medically only without cath. His most recent [address] from 8/22/2019 was abnormal, moderate risk showing moderate sized area of mild ischemia within the anterior-lateral wall (normal rebecca study 11/13). He f/u Dr. Sharad Oconnor and underwent a most recent [facility] on 9/18/2019 that showed Severe diffuse disease but patent LIMA to LAD, SVG to small OM, and patent stents to LAD, OM, and RCA. No significant change when compared to previous films No need for PCI. Most recent ECHO 8/22/2019 showed EF=55% with mild aortic stenosis with a max velocity of 2.41 m/sec and a mean pressure gradient of 14 mmHg (no change from 3/17 study). At his visit on 6/2/2020 he reported he had been having an bilateral leg swelling. Most recent BLE venous dopplers on 6/2/2020 negative for DVT. His lasix was increased at his visit to help with swelling. A repeat BMP on 6/12/2020 showed NA+ 134, K+ 4.3, BUN 24, and creatinine 0.98. note: Most recent EKG 8/6/19 showed SB, incomplete RBBB, left atrial enlargement, rate 59 bpm.    Today he states he has been feeling fine since his last visit. His leg swelling has decreased with his increase in lasix. He is tolerating medications without issues. He states he continues to have SOB with exertion that is unchanged from his last visit.  He has some chest pain that seems to be controlled by his medications. He takes Imdur daily and Nitro as needed for chest pain. He states overall chest pain is relieved by Nitro. Patient currently denies any weight gain, edema, palpitations, chest pain, shortness of breath, dizziness, and syncope. Past Medical History   has a past medical history of CAD (coronary artery disease), Family history of early CAD, Hyperlipidemia, Hypertension, and S/P coronary artery stent placement. Surgical History:   has a past surgical history that includes Coronary angioplasty with stent; Cardiac catheterization (09/26/2011); Coronary artery bypass graft (03/21/2017); Cardiac catheterization (03/17/2017); Coronary angioplasty with stent (05/12/2009); Coronary angioplasty with stent (08/15/2003); and Coronary angioplasty with stent. Social History:   reports that he quit smoking about 27 years ago. His smoking use included cigarettes. He has a 12.00 pack-year smoking history. He has never used smokeless tobacco. He reports current alcohol use. He reports that he does not use drugs. Teaches Syriac. Hopes to retire in 4 years. Family History:  Negative for significant heart disease in parents     Home Medications:  Prior to Admission medications    Medication Sig Start Date End Date Taking? Authorizing Provider   naproxen (NAPROXEN) 500 MG EC tablet Take 500 mg by mouth 2 times daily (with meals). Prn      Yes Historical Provider, MD   lisinopril (PRINIVIL;ZESTRIL) 10 MG tablet Take 1 tablet by mouth daily. 9/19/11  Yes Beverley Fong MD   aspirin 81 MG tablet Take 81 mg by mouth daily. Yes Historical Provider, MD   ranitidine (ZANTAC) 150 MG tablet Take 150 mg by mouth daily as needed. Yes Historical Provider, MD   atorvastatin (LIPITOR) 40 MG tablet Take 40 mg by mouth nightly. Yes Historical Provider, MD   clopidogrel (PLAVIX) 75 MG tablet Take 75 mg by mouth daily.      Yes Historical Provider, MD   nitroGLYCERIN (NITROSTAT) 0.4 MG SL tablet Place 1 tablet under the tongue every 5 minutes as needed for Chest pain. 9/19/11 9/18/12  Marlee Hernández MD        Allergies:  Patient has no known allergies. Review of Systems:   · Constitutional: there has been no unanticipated weight loss. There's been no change in energy level, sleep pattern, or activity level. · Eyes: No visual changes or diplopia. No scleral icterus. · ENT: No Headaches, hearing loss or vertigo. No mouth sores or sore throat. · Cardiovascular: Reviewed in HPI  · Respiratory: No cough or wheezing, no sputum production. No hematemesis. · Gastrointestinal: No abdominal pain, appetite loss, blood in stools. No change in bowel or bladder habits. · Genitourinary: No dysuria, trouble voiding, or hematuria. · Musculoskeletal:  No gait disturbance, weakness or joint complaints. · Integumentary: No rash or pruritis. · Neurological: No headache, diplopia, change in muscle strength, numbness or tingling. No change in gait, balance, coordination, mood, affect, memory, mentation, behavior. · Psychiatric: No anxiety, no depression. · Endocrine: No malaise, fatigue or temperature intolerance. No excessive thirst, fluid intake, or urination. No tremor. · Hematologic/Lymphatic: No abnormal bruising or bleeding, blood clots or swollen lymph nodes. · Allergic/Immunologic: No nasal congestion or hives.     Physical Examination:    Vitals:    01/04/21 0753   BP: 120/74   Pulse: 65   SpO2: 97%        Wt Readings from Last 3 Encounters:   01/04/21 188 lb 8 oz (85.5 kg)   06/02/20 187 lb 9.6 oz (85.1 kg)   08/22/19 185 lb (83.9 kg)     Constitutional and General Appearance: NAD   Respiratory:  · Normal excursion and expansion without use of accessory muscles  · Resp Auscultation: Normal breath sounds without dullness  Cardiovascular:  · The apical impulses not displaced  · Heart tones are crisp and normal  · Cervical veins are not engorged  · The carotid upstroke is normal in amplitude and contour without delay or bruit  · Normal S1S2, No S3, +Soft NANI  · Peripheral pulses are symmetrical and full  · There is no clubbing, cyanosis of the extremities. · Trace BLE edema (improved from prior)  · Femoral Arteries: 2+ and equal  · Pedal Pulses: 2+ and equal   Abdomen:  · No masses or tenderness  · Liver/Spleen: No Abnormalities Noted  Neurological/Psychiatric:  · Alert and oriented in all spheres  · Moves all extremities well  · Exhibits normal gait balance and coordination  · No abnormalities of mood, affect, memory, mentation, or behavior are noted    GXT stress Test 7/20/17   ABNORMAL HIGH risk stress test  Normal LV size and systolic function. Left ventricular ejection fraction of  71%. Normal wall motion. There is a large severe defect in the anterior and  anterolateral walls at stress that completely reveres with rest consistent  with ischemia. Stress test 8/22/2019   Summary  Abnormal moderate risk myocardial perfusion study. There is moderate sized area of mild ischemia within the anterior-lateral  wall. Normal left ventricular size, wall motion, and function. The estimated left ventricular function is 73%. Echo 8/22/2019 Summary   Normal left ventricle systolic function with an estimated EF of 55%. No regional wall motion abnormalities are seen. Normal left ventricular diastolic filling pressure. The aortic valve is thickened/calcified with decreased leaflet mobility. Mild aortic stenosis. Trace aortic regurgitation. Mild to moderate tricuspid regurgitation. Systolic pulmonary artery pressure (SPAP) is normal and estimated at 25 mmHg   (right atrial pressure 3 mmHg).        Lab Results   Component Value Date    CHOL 131 08/22/2019    CHOL 152 07/10/2018    CHOL 97 07/10/2018     Lab Results   Component Value Date    TRIG 142 08/22/2019    TRIG 160 (H) 07/10/2018    TRIG 152 (H) 07/05/2017     Lab Results   Component Value Date    HDL 56 08/22/2019    HDL 55 07/10/2018    HDL 52 07/05/2017 Lab Results   Component Value Date    LDLCALC 47 08/22/2019    LDLCALC 65 07/10/2018    LDLCALC 74 07/05/2017     Lab Results   Component Value Date    LABVLDL 28 08/22/2019    LABVLDL 30 07/05/2017    LABVLDL 26 03/21/2017       Labs: BMP: 6/12/2020 6/12/2020 NA+ 134, K+ 4.3, BUN 24, and creatinine 0.98  Last lipids 4/3/2020 , HDL 55, LDL 64,     Assessment:     1. CAD (coronary artery disease):  Due to exertional CP and SOB symptoms I referred him for LHC on 3/17/17 which showed multi-vessel CAD. Referred for surgical revascularization and on 3/21/17 he underwent 4V CABG by Dr. Tom Lemons with LIMA to LAD and reverse SVG to ramus OM distal to the stent and PDA. Most recent [address] on 9/18/2019 that showed Severe diffuse disease but patent LIMA to LAD, SVG to small OM, and patent stents to LAD, OM, and RCA. No significant change when compared to previous films No need for PCI. He feels chronic angina baseline and responsive to occasional use of SL PRN NTG.     2. Essential hypertension, benign:  Well controlled and will continue current medical regimen. 3.  Hyperlipidemia: I personally reviewed most recent labs from 4/2020 Well controlled and at goal and will continue current medical regimen. 4. Lower extremity edema: Possibly venous insufficiency. Most recent BLE venous dopplers on 6/2/2020 negative for DVT. Note edema improved with lasix and will continue. Plan:  1. Continue current medications   2. Plan to repeat echocardiogram prior to next visit this summer to recheck aortic stenosis   3. He will be having blood work through PCP. We will give slips for blood work. Call and let us know when blood work is done. 4. Follow up with me in 6 months     Cost of prescription medications and patient compliance have been reviewed with patient. All questions answered. Scribe's attestation:   This note was scribed in the presence of Dr. Connor Raines, By Etelvina Qiu RN    I, Dr. Connor Raines, personally performed the services described in this documentation, as scribed by the above signed scribe in my presence. It is both accurate and complete to my knowledge. I agree with the details independently gathered by the clinical support staff, while the remaining scribed note accurately describes my personal service to the patient. Thank you for allowing me to participate in the care of this individual.    Ya Gil.  Gabriela Flores M.D., [facility]

## 2021-01-04 ENCOUNTER — OFFICE VISIT (OUTPATIENT)
Dept: CARDIOLOGY CLINIC | Age: 66
End: 2021-01-04
Payer: COMMERCIAL

## 2021-01-04 VITALS
OXYGEN SATURATION: 97 % | DIASTOLIC BLOOD PRESSURE: 74 MMHG | HEIGHT: 69 IN | BODY MASS INDEX: 27.92 KG/M2 | HEART RATE: 65 BPM | WEIGHT: 188.5 LBS | SYSTOLIC BLOOD PRESSURE: 120 MMHG

## 2021-01-04 DIAGNOSIS — M79.89 SWELLING OF LOWER EXTREMITY: ICD-10-CM

## 2021-01-04 DIAGNOSIS — I25.10 CORONARY ARTERY DISEASE INVOLVING NATIVE CORONARY ARTERY OF NATIVE HEART WITHOUT ANGINA PECTORIS: ICD-10-CM

## 2021-01-04 DIAGNOSIS — E78.00 PURE HYPERCHOLESTEROLEMIA: ICD-10-CM

## 2021-01-04 DIAGNOSIS — I10 ESSENTIAL HYPERTENSION, BENIGN: ICD-10-CM

## 2021-01-04 DIAGNOSIS — Z87.891 HISTORY OF TOBACCO ABUSE: Primary | ICD-10-CM

## 2021-01-04 DIAGNOSIS — I35.0 NONRHEUMATIC AORTIC VALVE STENOSIS: ICD-10-CM

## 2021-01-04 PROCEDURE — 99214 OFFICE O/P EST MOD 30 MIN: CPT | Performed by: INTERNAL MEDICINE

## 2021-02-25 RX ORDER — ATORVASTATIN CALCIUM 80 MG/1
80 TABLET, FILM COATED ORAL NIGHTLY
Qty: 90 TABLET | Refills: 3 | Status: SHIPPED | OUTPATIENT
Start: 2021-02-25 | End: 2022-02-25

## 2021-03-10 ENCOUNTER — TELEPHONE (OUTPATIENT)
Dept: CARDIOLOGY CLINIC | Age: 66
End: 2021-03-10

## 2021-03-10 RX ORDER — CLOPIDOGREL BISULFATE 75 MG/1
75 TABLET ORAL DAILY
Qty: 180 TABLET | Refills: 3 | Status: SHIPPED | OUTPATIENT
Start: 2021-03-10 | End: 2021-07-19 | Stop reason: SDUPTHER

## 2021-03-10 NOTE — TELEPHONE ENCOUNTER
Please confirm with patient what is plavix regimen is currently. I know he went for 2nd opinion and they did make recs regarding his [facility] which may be different than my usual recs. I would find out what he takes currently and continue.

## 2021-03-10 NOTE — TELEPHONE ENCOUNTER
Pharmacy calling for clarification on the dose clopidogrel (PLAVIX) 75 MG tablet pls call to advise thank you

## 2021-03-10 NOTE — TELEPHONE ENCOUNTER
NESS, please clarify on what you would like the pt taking. Pharmacy sts that the pt has been taking Plavix 75mg BID since 2018, but the current script sts 75mg qd. Thank you. Last ov 1/4/21  Assessment:      1. CAD (coronary artery disease):  Due to exertional CP and SOB symptoms I referred him for [address] on 3/17/17 which showed multi-vessel CAD. Referred for surgical revascularization and on 3/21/17 he underwent 4V CABG by Dr. Janak Murphy with GONSALVES to LAD and reverse SVG to ramus OM distal to the stent and PDA. Most recent [address] on 9/18/2019 that showed Severe diffuse disease but patent LIMA to LAD, SVG to small OM, and patent stents to LAD, OM, and RCA. No significant change when compared to previous films No need for PCI. He feels chronic angina baseline and responsive to occasional use of SL PRN NTG.      2. Essential hypertension, benign:  Well controlled and will continue current medical regimen.      3. Hyperlipidemia: I personally reviewed most recent labs from 4/2020 Well controlled and at goal and will continue current medical regimen.     4. Lower extremity edema: Possibly venous insufficiency. Most recent BLE venous dopplers on 6/2/2020 negative for DVT. Note edema improved with lasix and will continue.     Plan:  1. Continue current medications   2. Plan to repeat echocardiogram prior to next visit this summer to recheck aortic stenosis   3. He will be having blood work through PCP. We will give slips for blood work. Call and let us know when blood work is done.    4. Follow up with me in 6 months

## 2021-03-12 NOTE — TELEPHONE ENCOUNTER
Spoke to pt and pt sts that insurance needs a PA for his Plavix and that form was supposed to be faxed over to the office last Thursday 3/4/21. I stated to pt that I did not receive a PA request @ the [place] office. Spoke to pharmacy and they stated that they have reached out several times re: this PA. Pt unfortunately had to pay out of pocket for 45 days of medication. I told the pharmacist to please resend PA form to [place] so I can get medication approval before pt is due for another refill.  I will start on PA 3/12/21

## 2021-06-01 RX ORDER — LISINOPRIL 5 MG/1
5 TABLET ORAL NIGHTLY
Qty: 90 TABLET | Refills: 2 | Status: SHIPPED | OUTPATIENT
Start: 2021-06-01 | End: 2022-02-21

## 2021-06-24 RX ORDER — ISOSORBIDE MONONITRATE 60 MG/1
60 TABLET, EXTENDED RELEASE ORAL 2 TIMES DAILY
Qty: 180 TABLET | Refills: 3 | Status: SHIPPED | OUTPATIENT
Start: 2021-06-24 | End: 2022-03-24 | Stop reason: SDUPTHER

## 2021-07-06 ENCOUNTER — HOSPITAL ENCOUNTER (OUTPATIENT)
Dept: CARDIOLOGY | Age: 66
Discharge: HOME | End: 2021-07-06
Payer: COMMERCIAL

## 2021-07-06 DIAGNOSIS — I35.0 NONRHEUMATIC AORTIC VALVE STENOSIS: ICD-10-CM

## 2021-07-06 LAB
LV EF: 63 %
LVEF MODALITY: NORMAL

## 2021-07-06 PROCEDURE — 93306 TTE W/DOPPLER COMPLETE: CPT

## 2021-07-13 RX ORDER — FUROSEMIDE 40 MG/1
40 TABLET ORAL 2 TIMES DAILY
Qty: 180 TABLET | Refills: 2 | Status: SHIPPED | OUTPATIENT
Start: 2021-07-13 | End: 2022-03-24 | Stop reason: SDUPTHER

## 2021-07-16 NOTE — PROGRESS NOTES
[address]   Cardiac Followup    Referring Provider:  Hortencia Fitzgerald     Chief Complaint   Patient presents with    6 Month Follow-Up    Shortness of Breath    Fatigue    Dizziness    Edema    Chest Pain    Palpitations      Subjective: Mr Dill Gillespie presents today for cardiology follow up of CAD s/p stents, hx CABG, AS, HTN, HLD, and LE edema; c/o worsening CLINE today along with dizziness, LE swelling    History of Present Illness:    Mr. Fuentes Gillespie is a 77 y.o. male with hx of CAD s/p 4V CABG 3/17, HTN, HLD, mild AS, s/p LAD and OM stent with POBA diagonal branch in 2003, s/p complex PCI complex LAD/diagonal and CCx/RCA in 8/17. Due to CP/SOB I referred him for [facility] on 3/17/17 which showed 70-80% LAD, 99% D1, 70% OM1, 90% OM2, 100% mid RCA; normal LVEF; normal hemodynamics. Referred to CT surgery and on 3/21/17 he underwent 4V CABG by Dr. Rob Mathews with LIMA to LAD and reverse SVG to ramus OM distal to the stent and PDA. Most recent VL carotid Duplex showed 3/20/17 showed minimal plaque <50% bilaterally. He underwent LHC 8/7/17 PCI Complex LAD, diag Rt groin and on again 8/9/17 had PCI of complex circumflex, RCA  with Dr. Jacquelyn Jackson at [facility]. Plavix increased 150 mg daily by Dr. Susana Contreras at [facility] (he went for 2nd opinion) based on genetic testing results. Note LHC at [facility] per Dr. Jacquelyn Jackson on 1/20/18 showed native disease but no PCI placed. EECP done afterwards. His most recent [facility] 8/22/2019 was abnormal, moderate risk showing moderate sized area of mild ischemia within the anterior-lateral wall. He f/u Dr. Jacquelyn Jackson and had most recent [facility] on 9/18/2019 that showed Severe diffuse disease but patent LIMA to LAD, SVG to small OM, and patent stents to LAD, OM, and RCA. No significant change when compared to previous films No need for PCI.  Most recent ECHO 8/22/2019 showed EF=55% with mild aortic stenosis with a max velocity of 2.41 m/sec and a mean pressure gradient of 14 mmHg (no change from 3/17 study). At his visit on 6/2/2020 he reported he had been having an bilateral leg swelling. Most recent BLE venous dopplers on 6/2/2020 negative for DVT. Most recent EKG 8/6/19 showed SB, incomplete RBBB, left atrial enlargement, rate 59 bpm. Most recent ECHO on 07/06/21 ECHO showed normal EF of 60-65%; AV velocity=2.65m/s; mean pressure gradient=17mmHg; mild AI/MR; mild-mod TR    Today, patient states he is taking all medications as prescribed and tolerating well. Denies recent issues with bleeding or bruising. Patient presents with his wife. He states he generally feels good. He states when climbing 12 steps up from his basement he becomes SOB. He is has dizziness with going from sitting to standing position at home. He states he wears compression stockings daily and that has helped with leg edema. Patient is experiencing worsening fatigue. Patient denies chest pain, palpitations, or syncope. Past Medical History   has a past medical history of CAD (coronary artery disease), Family history of early CAD, Hyperlipidemia, Hypertension, and S/P coronary artery stent placement. Surgical History:   has a past surgical history that includes Coronary angioplasty with stent; Cardiac catheterization (09/26/2011); Coronary artery bypass graft (03/21/2017); Cardiac catheterization (03/17/2017); Coronary angioplasty with stent (05/12/2009); Coronary angioplasty with stent (08/15/2003); and Coronary angioplasty with stent. Social History:   reports that he quit smoking about 28 years ago. His smoking use included cigarettes. He has a 12.00 pack-year smoking history. He has never used smokeless tobacco. He reports current alcohol use. He reports that he does not use drugs. 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 Family History:  Negative for significant heart disease in parents     Home Medications:  Prior to Admission medications    Medication Sig Start Date End Date Taking?  Authorizing Provider naproxen (NAPROXEN) 500 MG EC tablet Take 500 mg by mouth 2 times daily (with meals). Prn      Yes Historical Provider, MD   lisinopril (PRINIVIL;ZESTRIL) 10 MG tablet Take 1 tablet by mouth daily. 9/19/11  Yes Dougie Huang MD   aspirin 81 MG tablet Take 81 mg by mouth daily. Yes Historical Provider, MD   ranitidine (ZANTAC) 150 MG tablet Take 150 mg by mouth daily as needed. Yes Historical Provider, MD   atorvastatin (LIPITOR) 40 MG tablet Take 40 mg by mouth nightly. Yes Historical Provider, MD   clopidogrel (PLAVIX) 75 MG tablet Take 75 mg by mouth daily. Yes Historical Provider, MD   nitroGLYCERIN (NITROSTAT) 0.4 MG SL tablet Place 1 tablet under the tongue every 5 minutes as needed for Chest pain. 9/19/11 9/18/12  Dougie Huang MD        Allergies:  Patient has no known allergies. Review of Systems:   · Constitutional: there has been no unanticipated weight loss. There's been no change in energy level, sleep pattern, or activity level. · Eyes: No visual changes or diplopia. No scleral icterus. · ENT: No Headaches, hearing loss or vertigo. No mouth sores or sore throat. · Cardiovascular: Reviewed in HPI  · Respiratory: No cough or wheezing, no sputum production. No hematemesis. · Gastrointestinal: No abdominal pain, appetite loss, blood in stools. No change in bowel or bladder habits. · Genitourinary: No dysuria, trouble voiding, or hematuria. · Musculoskeletal:  No gait disturbance, weakness or joint complaints. · Integumentary: No rash or pruritis. · Neurological: No headache, diplopia, change in muscle strength, numbness or tingling. No change in gait, balance, coordination, mood, affect, memory, mentation, behavior. · Psychiatric: No anxiety, no depression. · Endocrine: No malaise, fatigue or temperature intolerance. No excessive thirst, fluid intake, or urination. No tremor.   · Hematologic/Lymphatic: No abnormal bruising or bleeding, blood clots or swollen lymph nodes.  · Allergic/Immunologic: No nasal congestion or hives. Physical Examination:    Vitals:    07/19/21 0751   BP: 96/66   Pulse: 59   SpO2: 98%        Wt Readings from Last 3 Encounters:   07/19/21 186 lb (84.4 kg)   01/04/21 188 lb 8 oz (85.5 kg)   06/02/20 187 lb 9.6 oz (85.1 kg)     Constitutional and General Appearance: NAD   Respiratory:  · Normal excursion and expansion without use of accessory muscles  · Resp Auscultation: Normal breath sounds without dullness  Cardiovascular:  · The apical impulses not displaced  · Heart tones are crisp and normal  · Cervical veins are not engorged  · The carotid upstroke is normal in amplitude and contour without delay or bruit  · Normal S1S2, No S3, +Soft NANI  · Peripheral pulses are symmetrical and full  · There is no clubbing, cyanosis of the extremities. · No edema BLE  · Femoral Arteries: 2+ and equal  · Pedal Pulses: 2+ and equal   Abdomen:  · No masses or tenderness  · Liver/Spleen: No Abnormalities Noted  Neurological/Psychiatric:  · Alert and oriented in all spheres  · Moves all extremities well  · Exhibits normal gait balance and coordination  · No abnormalities of mood, affect, memory, mentation, or behavior are noted    GXT stress Test 7/20/17   ABNORMAL HIGH risk stress test  Normal LV size and systolic function. Left ventricular ejection fraction of  71%. Normal wall motion. There is a large severe defect in the anterior and  anterolateral walls at stress that completely reveres with rest consistent  with ischemia. Stress test 8/22/2019   Summary  Abnormal moderate risk myocardial perfusion study. There is moderate sized area of mild ischemia within the anterior-lateral  wall. Normal left ventricular size, wall motion, and function. The estimated left ventricular function is 73%. Echo 07/06/2021  Normal left ventricle size, wall thickness and systolic function with an estimated ejection fraction of 60-65%.   No regional wall motion abnormalities are seen. Normal left ventricular diastolic filling pressure. The aortic valve is thickened/calcified with decreased leaflet mobility. The aortic valve area is calculated at 1.1 cm2 with max velocity of 2.65 m/sec, a maximum pressure gradient of 28 mmHg and a mean  pressure gradient of 17 mmHg. This is c/w moderate aortic stenosis. Qualitatively there is moderate to severe AS. MIld aortic regurgitation. Mild mitral regurgitation. Mild to moderate tricuspid regurgitation. Systolic pulmonary artery pressure (SPAP) is normal and estimated at 25 mmHg (right atrial pressure 3 mmHg). Echo 8/22/2019    Normal left ventricle systolic function with an estimated EF of 55%. No regional wall motion abnormalities are seen. Normal left ventricular diastolic filling pressure. The aortic valve is thickened/calcified with decreased leaflet mobility. Mild aortic stenosis. Trace aortic regurgitation. Mild to moderate tricuspid regurgitation. Systolic pulmonary artery pressure (SPAP) is normal and estimated at 25 mmHg   (right atrial pressure 3 mmHg). Lab Results   Component Value Date    CHOL 131 08/22/2019    CHOL 152 07/10/2018    CHOL 97 07/10/2018     Lab Results   Component Value Date    TRIG 142 08/22/2019    TRIG 160 (H) 07/10/2018    TRIG 152 (H) 07/05/2017     Lab Results   Component Value Date    HDL 56 08/22/2019    HDL 55 07/10/2018    HDL 52 07/05/2017     Lab Results   Component Value Date    LDLCALC 47 08/22/2019    LDLCALC 65 07/10/2018    LDLCALC 74 07/05/2017     Lab Results   Component Value Date    LABVLDL 28 08/22/2019    LABVLDL 30 07/05/2017    LABVLDL 26 03/21/2017       Labs:   BMP: 04/19/21 w/  [facility] Na+ 137, K+ 4.8, BUN 18, Creatinine 0.97, Phos 2.4, AST 29, ALT 28  Lipids: 04/19/21 [facility] , TG 95, HDL 60, LDL 46    Assessment:     1. CAD (coronary artery disease): [facility] on 3/17/17 which showed multi-vessel CAD.   Referred for surgical revascularization and on 3/21/17 he underwent 4V CABG by Dr. Chloe Santiago. Most recent [address] on 9/18/2019 with severe diffuse disease but patent LIMA to LAD, SVG to small OM, and patent stents to LAD, OM, and RCA. No significant change when compared to previous films No need for PCI. See #5 below. 2. Essential hypertension, benign:  Well controlled and will continue current medical regimen. 3.  Hyperlipidemia: I personally reviewed most recent labs from 4/2021 Well controlled and at goal and will continue current medical regimen. 4. Lower extremity edema: Possibly venous insufficiency. Most recent BLE venous dopplers on 6/2/2020 negative for DVT. Note edema improved with lasix. He has worse days occasionally and I advised extra lasix PRN along with SWATI hose, salt/fluid restrict. 5. AS: Most recent ECHO on 07/06/21 ECHO showed normal EF of 60-65%; AV velocity=2.65m/s; mean pressure gradient=17mmHg; mild AI/MR; mild-mod TR. I am concerned with worsened CLINE. I reviewed ECHO and the valve appears more calcified and thickened and not c/w velocity/pressure measurement which indicate more mild-mod AS. He has severe CAD as well and need concern for ischemia and/or AS contributing to CLINE. I want to refer for LHC/RHC with valve study to assess AS also. I had long d/w Mr. Jayleen Quiñones and wife about symptoms, ECHO findings, and plans regarding full cath study. They are in agreement. Plan:  1. Medication Reviewed, refills as warranted. 2.Recommend LHC/RHC/valve study for AS given discrepant qualitative and quantitative date on recent ECHO   ~To evaluate coronary artery and aortic stenosis   ~Patient wishes to have cath Dr. Marybeth Ambrosio at [facility]. I called and had d/w Dr. Marybeth Ambrosio as well and he agrees study needed and will  call and arrange cath per his office. I called Mr. Jayleen Quiñones to let him know and to expect call from  to schedule cath. 3. Okay to take an extra Lasix tablet for 2-3 days if you notice increase swelling.   4. Follow up 6

## 2021-07-19 ENCOUNTER — OFFICE VISIT (OUTPATIENT)
Dept: CARDIOLOGY CLINIC | Age: 66
End: 2021-07-19
Payer: COMMERCIAL

## 2021-07-19 VITALS
HEART RATE: 59 BPM | BODY MASS INDEX: 27.55 KG/M2 | HEIGHT: 69 IN | WEIGHT: 186 LBS | OXYGEN SATURATION: 98 % | SYSTOLIC BLOOD PRESSURE: 96 MMHG | DIASTOLIC BLOOD PRESSURE: 66 MMHG

## 2021-07-19 DIAGNOSIS — Z87.891 HISTORY OF TOBACCO ABUSE: ICD-10-CM

## 2021-07-19 DIAGNOSIS — R06.02 SHORTNESS OF BREATH: ICD-10-CM

## 2021-07-19 DIAGNOSIS — I25.10 CORONARY ARTERY DISEASE INVOLVING NATIVE CORONARY ARTERY OF NATIVE HEART WITHOUT ANGINA PECTORIS: Primary | ICD-10-CM

## 2021-07-19 DIAGNOSIS — E78.00 PURE HYPERCHOLESTEROLEMIA: ICD-10-CM

## 2021-07-19 DIAGNOSIS — I10 ESSENTIAL HYPERTENSION, BENIGN: ICD-10-CM

## 2021-07-19 PROCEDURE — 99215 OFFICE O/P EST HI 40 MIN: CPT | Performed by: INTERNAL MEDICINE

## 2021-07-19 RX ORDER — METHOCARBAMOL 750 MG/1
TABLET, FILM COATED ORAL
COMMUNITY
Start: 2021-05-28

## 2021-07-19 RX ORDER — METOPROLOL TARTRATE 50 MG/1
50 TABLET, FILM COATED ORAL 2 TIMES DAILY
Qty: 180 TABLET | Refills: 3 | Status: SHIPPED | OUTPATIENT
Start: 2021-07-19 | End: 2022-03-24 | Stop reason: SDUPTHER

## 2021-07-19 RX ORDER — SOY ISOFLAVONE 40 MG
500 TABLET ORAL 2 TIMES DAILY
Qty: 180 CAPSULE | Refills: 3 | Status: SHIPPED | OUTPATIENT
Start: 2021-07-19 | End: 2022-02-21

## 2021-07-19 RX ORDER — GABAPENTIN 300 MG/1
300 CAPSULE ORAL 3 TIMES DAILY
COMMUNITY
Start: 2021-02-25

## 2021-07-19 RX ORDER — CLOPIDOGREL BISULFATE 75 MG/1
150 TABLET ORAL DAILY
Qty: 180 TABLET | Refills: 3 | Status: SHIPPED | OUTPATIENT
Start: 2021-07-19 | End: 2022-03-24 | Stop reason: SDUPTHER

## 2021-07-19 NOTE — LETTER
[address]   Cardiac Followup    Referring Provider:  [facility]     Chief Complaint   Patient presents with    6 Month Follow-Up    Shortness of Breath    Fatigue    Dizziness    Edema    Chest Pain    Palpitations      Subjective: Mr Daya Ambrosio presents today for cardiology follow up of CAD s/p stents, hx CABG, AS, HTN, HLD, and LE edema; c/o worsening CLINE today along with dizziness, LE swelling    History of Present Illness:    Mr. Daya Ambrosio is a 77 y.o. male with hx of CAD s/p 4V CABG 3/17, HTN, HLD, mild AS, s/p LAD and OM stent with POBA diagonal branch in 2003, s/p complex PCI complex LAD/diagonal and CCx/RCA in 8/17. Due to CP/SOB I referred him for [facility] on [facility]/17 which showed 70-80% LAD, 99% D1, 70% OM1, 90% OM2, 100% mid RCA; normal LVEF; normal hemodynamics. Referred to CT surgery and on 3/21/17 he underwent 4V CABG by Dr. Matias Rizvi with LIMA to LAD and reverse SVG to ramus OM distal to the stent and PDA. Most recent  carotid Duplex showed 3/20/17 showed minimal plaque <50% bilaterally. He underwent LHC 8/7/17 PCI Complex LAD, diag Rt groin and on again 8/9/17 had PCI of complex circumflex, RCA  with Dr. Stanislav Gill at [facility]. Plavix increased 150 mg daily by Dr. Damon Mitchell at [facility] (he went for 2nd opinion) based on genetic testing results. Note LHC at [facility] per Dr. Stanislav Gill on 1/20/18 showed native disease but no PCI placed. EECP done afterwards. His most recent [facility] 8/22/2019 was abnormal, moderate risk showing moderate sized area of mild ischemia within the anterior-lateral wall. He f/u Dr. Stanislav Gill and had most recent Health system on 9/18/2019 that showed Severe diffuse disease but patent LIMA to LAD, SVG to small OM, and patent stents to LAD, OM, and RCA. No significant change when compared to previous films No need for PCI.  Most recent ECHO 8/22/2019 showed EF=55% with mild aortic stenosis with a max velocity of 2.41 m/sec and a mean pressure gradient of 14 mmHg (no change from 3/17 study). At his visit on 6/2/2020 he reported he had been having an bilateral leg swelling. Most recent BLE venous dopplers on 6/2/2020 negative for DVT. Most recent EKG 8/6/19 showed SB, incomplete RBBB, left atrial enlargement, rate 59 bpm. Most recent ECHO on 07/06/21 ECHO showed normal EF of 60-65%; AV velocity=2.65m/s; mean pressure gradient=17mmHg; mild AI/MR; mild-mod TR    Today, patient states he is taking all medications as prescribed and tolerating well. Denies recent issues with bleeding or bruising. Patient presents with his wife. He states he generally feels good. He states when climbing 12 steps up from his basement he becomes SOB. He is has dizziness with going from sitting to standing position at home. He states he wears compression stockings daily and that has helped with leg edema. Patient is experiencing worsening fatigue. Patient denies chest pain, palpitations, or syncope. Past Medical History   has a past medical history of CAD (coronary artery disease), Family history of early CAD, Hyperlipidemia, Hypertension, and S/P coronary artery stent placement. Surgical History:   has a past surgical history that includes Coronary angioplasty with stent; Cardiac catheterization (09/26/2011); Coronary artery bypass graft (03/21/2017); Cardiac catheterization (03/17/2017); Coronary angioplasty with stent (05/12/2009); Coronary angioplasty with stent (08/15/2003); and Coronary angioplasty with stent. Social History:   reports that he quit smoking about 28 years ago. His smoking use included cigarettes. He has a 12.00 pack-year smoking history. He has never used smokeless tobacco. He reports current alcohol use. He reports that he does not use drugs. 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 Family History:  Negative for significant heart disease in parents     Home Medications:  Prior to Admission medications    Medication Sig Start Date End Date Taking?  Authorizing Provider nodes.  · Allergic/Immunologic: No nasal congestion or hives. Physical Examination:    Vitals:    07/19/21 0751   BP: 96/66   Pulse: 59   SpO2: 98%        Wt Readings from Last 3 Encounters:   07/19/21 186 lb (84.4 kg)   01/04/21 188 lb 8 oz (85.5 kg)   06/02/20 187 lb 9.6 oz (85.1 kg)     Constitutional and General Appearance: NAD   Respiratory:  · Normal excursion and expansion without use of accessory muscles  · Resp Auscultation: Normal breath sounds without dullness  Cardiovascular:  · The apical impulses not displaced  · Heart tones are crisp and normal  · Cervical veins are not engorged  · The carotid upstroke is normal in amplitude and contour without delay or bruit  · Normal S1S2, No S3, +Soft NANI  · Peripheral pulses are symmetrical and full  · There is no clubbing, cyanosis of the extremities. · No edema BLE  · Femoral Arteries: 2+ and equal  · Pedal Pulses: 2+ and equal   Abdomen:  · No masses or tenderness  · Liver/Spleen: No Abnormalities Noted  Neurological/Psychiatric:  · Alert and oriented in all spheres  · Moves all extremities well  · Exhibits normal gait balance and coordination  · No abnormalities of mood, affect, memory, mentation, or behavior are noted    GXT stress Test 7/20/17   ABNORMAL HIGH risk stress test  Normal LV size and systolic function. Left ventricular ejection fraction of  71%. Normal wall motion. There is a large severe defect in the anterior and  anterolateral walls at stress that completely reveres with rest consistent  with ischemia. Stress test 8/22/2019   Summary  Abnormal moderate risk myocardial perfusion study. There is moderate sized area of mild ischemia within the anterior-lateral  wall. Normal left ventricular size, wall motion, and function. The estimated left ventricular function is 73%. Echo 07/06/2021  Normal left ventricle size, wall thickness and systolic function with an estimated ejection fraction of 60-65%.   No regional wall motion abnormalities are seen. Normal left ventricular diastolic filling pressure. The aortic valve is thickened/calcified with decreased leaflet mobility. The aortic valve area is calculated at 1.1 cm2 with max velocity of 2.65 m/sec, a maximum pressure gradient of 28 mmHg and a mean  pressure gradient of 17 mmHg. This is c/w moderate aortic stenosis. Qualitatively there is moderate to severe AS. MIld aortic regurgitation. Mild mitral regurgitation. Mild to moderate tricuspid regurgitation. Systolic pulmonary artery pressure (SPAP) is normal and estimated at 25 mmHg (right atrial pressure 3 mmHg). Echo 8/22/2019    Normal left ventricle systolic function with an estimated EF of 55%. No regional wall motion abnormalities are seen. Normal left ventricular diastolic filling pressure. The aortic valve is thickened/calcified with decreased leaflet mobility. Mild aortic stenosis. Trace aortic regurgitation. Mild to moderate tricuspid regurgitation. Systolic pulmonary artery pressure (SPAP) is normal and estimated at 25 mmHg   (right atrial pressure 3 mmHg). Lab Results   Component Value Date    CHOL 131 08/22/2019    CHOL 152 07/10/2018    CHOL 97 07/10/2018     Lab Results   Component Value Date    TRIG 142 08/22/2019    TRIG 160 (H) 07/10/2018    TRIG 152 (H) 07/05/2017     Lab Results   Component Value Date    HDL 56 08/22/2019    HDL 55 07/10/2018    HDL 52 07/05/2017     Lab Results   Component Value Date    LDLCALC 47 08/22/2019    LDLCALC 65 07/10/2018    LDLCALC 74 07/05/2017     Lab Results   Component Value Date    LABVLDL 28 08/22/2019    LABVLDL 30 07/05/2017    LABVLDL 26 03/21/2017       Labs:   BMP: 04/19/21 w/  BDA Na+ 137, K+ 4.8, BUN 18, Creatinine 0.97, Phos 2.4, AST 29, ALT 28  Lipids: 04/19/21 [facility] BDA , TG 95, HDL 60, LDL 46    Assessment:     1. CAD (coronary artery disease): [facility] on 3/17/17 which showed multi-vessel CAD.   Referred for surgical revascularization and on 3/21/17 he underwent 4V CABG by Dr. Brandon Herrera. Most recent [facility] on 9/18/2019 with severe diffuse disease but patent LIMA to LAD, SVG to small OM, and patent stents to LAD, OM, and RCA. No significant change when compared to previous films No need for PCI. See #5 below. 2. Essential hypertension, benign:  Well controlled and will continue current medical regimen. 3.  Hyperlipidemia: I personally reviewed most recent labs from 4/2021 Well controlled and at goal and will continue current medical regimen. 4. Lower extremity edema: Possibly venous insufficiency. Most recent BLE venous dopplers on 6/2/2020 negative for DVT. Note edema improved with lasix. He has worse days occasionally and I advised extra lasix PRN along with SWATI hose, salt/fluid restrict. 5. AS: Most recent ECHO on 07/06/21 ECHO showed normal EF of 60-65%; AV velocity=2.65m/s; mean pressure gradient=17mmHg; mild AI/MR; mild-mod TR. I am concerned with worsened CLINE. I reviewed ECHO and the valve appears more calcified and thickened and not c/w velocity/pressure measurement which indicate more mild-mod AS. He has severe CAD as well and need concern for ischemia and/or AS contributing to CLINE. I want to refer for LHC/RHC with valve study to assess AS also. I will d/w Dr. Clarita Jean-Baptiste and make arrangements afterwards. Plan:  1. Medication Reviewed, refills as warranted. 2.Recommend Left Heart Cath   ~To evaluate coronary artery and aortic stenosis   ~Patient wishes to have LHC with Dr. Clarita Jean-Baptiste  3. Okay to take an extra Lasix tablet for 2-3 days if you notice increase swelling. 4. Follow up 6 months    Cost of prescription medications and patient compliance have been reviewed with patient. All questions answered. Scribe's attestation:   This note was scribed in the presence of Ernst Padilla by Alicja Zaman RN    I, Dr. Aditya Lua, personally performed the services described in this documentation, as scribed by the above signed scribe in my presence. It is both accurate and complete to my knowledge. I agree with the details independently gathered by the clinical support staff, while the remaining scribed note accurately describes my personal service to the patient. Thank you for allowing me to participate in the care of this individual.    Kitty Maravilla.  Justin Walters M.D., [facility]

## 2021-11-16 ENCOUNTER — TELEPHONE (OUTPATIENT)
Dept: CARDIOLOGY CLINIC | Age: 66
End: 2021-11-16

## 2021-11-16 NOTE — TELEPHONE ENCOUNTER
Intermediate risk clinically for intermediate risk type of surgery. Proceed as planned. OK to hold plavix for least amount of time surgeon will allow. Continue baby aspirin if able. Thanks.

## 2022-02-21 RX ORDER — PSYLLIUM HUSK 0.4 G
CAPSULE ORAL
Qty: 200 TABLET | Refills: 3 | Status: SHIPPED | OUTPATIENT
Start: 2022-02-21

## 2022-02-21 RX ORDER — LISINOPRIL 5 MG/1
5 TABLET ORAL NIGHTLY
Qty: 90 TABLET | Refills: 2 | Status: SHIPPED | OUTPATIENT
Start: 2022-02-21 | End: 2022-03-24 | Stop reason: SDUPTHER

## 2022-02-25 RX ORDER — ATORVASTATIN CALCIUM 80 MG/1
80 TABLET, FILM COATED ORAL NIGHTLY
Qty: 90 TABLET | Refills: 3 | Status: SHIPPED | OUTPATIENT
Start: 2022-02-25 | End: 2022-03-24 | Stop reason: SDUPTHER

## 2022-03-21 NOTE — PROGRESS NOTES
[address]   Cardiac Followup    Referring Provider:  Jeff Madera     Chief Complaint   Patient presents with    6 Month Follow-Up    Coronary Artery Disease    Congestive Heart Failure    Chest Pain    Shortness of Breath      Subjective: Mr Felicia Oliva presents today for cardiology follow up of CAD s/p stents, hx CABG, AS, HTN, HLD, and LE edema; c/o baseline exertional CP and swelling today    History of Present Illness:    Mr. Felicia Oliva is a 74 y.o. male with hx of CAD s/p 4V CABG 3/17, HTN, HLD, mild AS, s/p LAD and OM stent with POBA diagonal branch in 2003, s/p complex PCI complex LAD/diagonal and CCx/RCA in 8/17. Due to CP/SOB I referred him for [facility] on 3/17/17 showing MV CAD. Referred to CT surgery and on 3/21/17 he underwent 4V CABG by Dr. Grisel Ortega with LIMA to LAD and reverse SVG to ramus OM distal to the stent and PDA. Most recent VL carotid Duplex showed 3/20/17 showed minimal plaque <50% bilaterally. Plavix increased 150 mg daily by Dr. Faustina Nowak at [facility] (he went for 2nd opinion) based on genetic testing results. Note LHC at [facility] per Dr. Eric Reece on 1/20/18 showed native disease but no PCI placed. EECP done afterwards. His most recent [facility] 8/22/2019 was abnormal, moderate risk showing moderate sized area of mild ischemia within the anterior-lateral wall. He f/u Dr. Eric Reece and had most recent [facility] on 8/2/21 that showed Severe diffuse disease with patent stents, patent LIMA-LAD and SVG-OM with very small graft to distal RCA occluded (new finding); no need for PCI; AS study showed no severe findings with essentially normal RHC pressures. Most recent BLE venous dopplers on 6/2/2020 negative for DVT. Most recent ECHO 7/6/21 EF=60-65% (EF=55% in 8/19, 3/17); AV velocity=2.65m/s; mean pressure gradient=17mmHg; mild AI/MR; mild-mod TR. Underwent bunionectomy right foot 1/2022. Today he states he is doing well overall. He states he just got back from [place] for vacation.  He is tolerating activities and exercises. He has stable, exertional CP with heavier activity mainly. He uses compression stocking for swelling in his legs. He states he notices increased swelling in the left leg. He notices occasional chest pain with heavy lifting. The pain goes away on it's own. He denies worsening chest pain. Patient denies sob, palpitations, dizziness or syncope. Past Medical History   has a past medical history of CAD (coronary artery disease), Family history of early CAD, Hyperlipidemia, Hypertension, and S/P coronary artery stent placement. Surgical History:   has a past surgical history that includes Coronary angioplasty with stent; Cardiac catheterization (09/26/2011); Coronary artery bypass graft (03/21/2017); Cardiac catheterization (03/17/2017); Coronary angioplasty with stent (05/12/2009); Coronary angioplasty with stent (08/15/2003); and Coronary angioplasty with stent. Social History:   reports that he quit smoking about 1990. His smoking use included cigarettes. He has a 12.00 pack-year smoking history. He has never used smokeless tobacco. He reports current alcohol use. He reports that he does not use drugs. 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 Family History:  Negative for significant heart disease in parents     Home Medications:  Prior to Admission medications    Medication Sig Start Date End Date Taking? Authorizing Provider   naproxen (NAPROXEN) 500 MG EC tablet Take 500 mg by mouth 2 times daily (with meals). Prn      Yes Historical Provider, MD   lisinopril (PRINIVIL;ZESTRIL) 10 MG tablet Take 1 tablet by mouth daily. 9/19/11  Yes Sonam Hall MD   aspirin 81 MG tablet Take 81 mg by mouth daily. Yes Historical Provider, MD   ranitidine (ZANTAC) 150 MG tablet Take 150 mg by mouth daily as needed. Yes Historical Provider, MD   atorvastatin (LIPITOR) 40 MG tablet Take 40 mg by mouth nightly.      Yes Historical Provider, MD clopidogrel (PLAVIX) 75 MG tablet Take 75 mg by mouth daily. Yes Historical Provider, MD   nitroGLYCERIN (NITROSTAT) 0.4 MG SL tablet Place 1 tablet under the tongue every 5 minutes as needed for Chest pain. 9/19/11 9/18/12  Gilmar Danielson MD        Allergies:  Patient has no known allergies. Review of Systems:   · Constitutional: there has been no unanticipated weight loss. There's been no change in energy level, sleep pattern, or activity level. · Eyes: No visual changes or diplopia. No scleral icterus. · ENT: No Headaches, hearing loss or vertigo. No mouth sores or sore throat. · Cardiovascular: Reviewed in HPI  · Respiratory:Clear,  No cough or wheezing, no sputum production. No hematemesis. · Gastrointestinal: No abdominal pain, appetite loss, blood in stools. No change in bowel or bladder habits. · Genitourinary: No dysuria, trouble voiding, or hematuria. · Musculoskeletal:  No gait disturbance, weakness or joint complaints. · Integumentary: No rash or pruritis. · Neurological: No headache, diplopia, change in muscle strength, numbness or tingling. No change in gait, balance, coordination, mood, affect, memory, mentation, behavior. · Psychiatric: No anxiety, no depression. · Endocrine: No malaise, fatigue or temperature intolerance. No excessive thirst, fluid intake, or urination. No tremor. · Hematologic/Lymphatic: No abnormal bruising or bleeding, blood clots or swollen lymph nodes. · Allergic/Immunologic: No nasal congestion or hives.     Physical Examination:    Vitals:    03/24/22 0830   BP: 104/62   Pulse: 70   SpO2: 98%        Wt Readings from Last 3 Encounters:   03/24/22 183 lb (83 kg)   07/19/21 186 lb (84.4 kg)   01/04/21 188 lb 8 oz (85.5 kg)     Constitutional and General Appearance: NAD   Respiratory:  · Normal excursion and expansion without use of accessory muscles  · Resp Auscultation: Normal breath sounds without dullness  Cardiovascular:  · The apical impulses not displaced  · Heart tones are crisp and normal  · Cervical veins are not engorged  · The carotid upstroke is normal in amplitude and contour without delay or bruit  · Normal S1S2, No S3, +Soft NANI  · Peripheral pulses are symmetrical and full  · There is no clubbing, cyanosis of the extremities. · Trace +1 edema  · Femoral Arteries: 2+ and equal  · Pedal Pulses: 2+ and equal   Abdomen:  · No masses or tenderness  · Liver/Spleen: No Abnormalities Noted  Neurological/Psychiatric:  · Alert and oriented in all spheres  · Moves all extremities well  · Exhibits normal gait balance and coordination  · No abnormalities of mood, affect, memory, mentation, or behavior are noted    GXT stress Test 7/20/17   ABNORMAL HIGH risk stress test  Normal LV size and systolic function. Left ventricular ejection fraction of  71%. Normal wall motion. There is a large severe defect in the anterior and  anterolateral walls at stress that completely reveres with rest consistent  with ischemia. Stress test 8/22/2019   Summary  Abnormal moderate risk myocardial perfusion study. There is moderate sized area of mild ischemia within the anterior-lateral  wall. Normal left ventricular size, wall motion, and function. The estimated left ventricular function is 73%. Echo 07/06/2021  Normal left ventricle size, wall thickness and systolic function with an estimated ejection fraction of 60-65%. No regional wall motion abnormalities are seen. Normal left ventricular diastolic filling pressure. The aortic valve is thickened/calcified with decreased leaflet mobility. The aortic valve area is calculated at 1.1 cm2 with max velocity of 2.65 m/sec, a maximum pressure gradient of 28 mmHg and a mean  pressure gradient of 17 mmHg. This is c/w moderate aortic stenosis. Qualitatively there is moderate to severe AS. MIld aortic regurgitation. Mild mitral regurgitation. Mild to moderate tricuspid regurgitation.   Systolic pulmonary artery pressure (SPAP) is normal and estimated at 25 mmHg (right atrial pressure 3 mmHg)       Lab Results   Component Value Date    CHOL 131 08/22/2019    CHOL 152 07/10/2018    CHOL 97 07/10/2018     Lab Results   Component Value Date    TRIG 142 08/22/2019    TRIG 160 (H) 07/10/2018    TRIG 152 (H) 07/05/2017     Lab Results   Component Value Date    HDL 56 08/22/2019    HDL 55 07/10/2018    HDL 52 07/05/2017     Lab Results   Component Value Date    LDLCALC 47 08/22/2019    LDLCALC 65 07/10/2018    LDLCALC 74 07/05/2017     Lab Results   Component Value Date    LABVLDL 28 08/22/2019    LABVLDL 30 07/05/2017    LABVLDL 26 03/21/2017       Labs:   BMP: 04/19/21 /  [employer] Na+ 137, K+ 4.8, BUN 18, Creatinine 0.97, Phos 2.4, AST 29, ALT 28  Lipids: 04/19/21 [facility] [employer] , TG 95, HDL 60, LDL 46    Assessment:     1. CAD (coronary artery disease): On 3/21/17 underwent 4V CABG by Dr. Minh Mcneill. Most recent [facility] on 9/18/2019 with severe diffuse disease but patent LIMA to LAD, SVG to small OM, and patent stents to LAD, OM, and RCA. No significant change when compared to previous films No need for PCI. See #5 below. He has stable exertional angina and will continue anti-anginal med regimen. 2. Essential hypertension, benign:  Well controlled and will continue current medical regimen. 3.  Hyperlipidemia: I personally reviewed most recent labs from 4/2021 Well controlled and at goal and will continue current medical regimen. Needs repeat labs in April 2022.    4. Lower extremity edema: Probable venous insufficiency. Most recent BLE venous dopplers on 6/2/2020 negative for DVT. He can use lasix PRN along with SWATI hose, salt/fluid restrict, raising legs    5. AS: Most recent ECHO on 07/06/21 ECHO showed normal EF of 60-65%; AV velocity=2.65m/s; mean pressure gradient=17mmHg. Follow clinically and will repeat study in couple of years. Plan:  1.  Recommend 64 ounces or less of fluids daily to help decrease swelling  2. Monitor sodium intake  3. Recommend a cardiac healthy diet (low salt, avoid red meat, avoid fatty or fried foods, lots of fruits and vegetables) as well as regular moderate intensity activity for 30 minutes per day 3-5 times per week. 4. Continue using compression stocking and elevating your legs above your heart to help decrease swelling  5. EKG today shows NSR 61bpm; LV; RBBB; NST change (no change 8/19)  6. Follow up in 9 months    This note was scribed in the presence of Tiffany Dahl MD by Sara Dillard RN.     I, Dr. Tiffany Dahl, personally performed the services described in this documentation, as scribed by the above signed scribe in my presence. It is both accurate and complete to my knowledge. I agree with the details independently gathered by the clinical support staff, while the remaining scribed note accurately describes my personal service to the patient.

## 2022-03-22 RX ORDER — RANOLAZINE 1000 MG/1
TABLET, EXTENDED RELEASE ORAL
Qty: 180 TABLET | Refills: 3 | Status: SHIPPED | OUTPATIENT
Start: 2022-03-22 | End: 2022-03-24 | Stop reason: SDUPTHER

## 2022-03-24 ENCOUNTER — OFFICE VISIT (OUTPATIENT)
Dept: CARDIOLOGY CLINIC | Age: 67
End: 2022-03-24
Payer: COMMERCIAL

## 2022-03-24 VITALS
OXYGEN SATURATION: 98 % | SYSTOLIC BLOOD PRESSURE: 104 MMHG | DIASTOLIC BLOOD PRESSURE: 62 MMHG | BODY MASS INDEX: 27.11 KG/M2 | WEIGHT: 183 LBS | HEART RATE: 70 BPM | HEIGHT: 69 IN

## 2022-03-24 DIAGNOSIS — I10 ESSENTIAL HYPERTENSION, BENIGN: ICD-10-CM

## 2022-03-24 DIAGNOSIS — I25.10 CORONARY ARTERY DISEASE INVOLVING NATIVE CORONARY ARTERY OF NATIVE HEART WITHOUT ANGINA PECTORIS: ICD-10-CM

## 2022-03-24 DIAGNOSIS — I20.0 UNSTABLE ANGINA (HCC): ICD-10-CM

## 2022-03-24 DIAGNOSIS — I50.32 CHRONIC DIASTOLIC CONGESTIVE HEART FAILURE (HCC): ICD-10-CM

## 2022-03-24 DIAGNOSIS — E78.00 PURE HYPERCHOLESTEROLEMIA: Primary | ICD-10-CM

## 2022-03-24 DIAGNOSIS — Z87.891 HISTORY OF TOBACCO ABUSE: ICD-10-CM

## 2022-03-24 PROCEDURE — 99214 OFFICE O/P EST MOD 30 MIN: CPT | Performed by: INTERNAL MEDICINE

## 2022-03-24 PROCEDURE — 93000 ELECTROCARDIOGRAM COMPLETE: CPT | Performed by: INTERNAL MEDICINE

## 2022-03-24 RX ORDER — RANOLAZINE 1000 MG/1
TABLET, EXTENDED RELEASE ORAL
Qty: 180 TABLET | Refills: 3 | Status: SHIPPED | OUTPATIENT
Start: 2022-03-24

## 2022-03-24 RX ORDER — ATORVASTATIN CALCIUM 80 MG/1
80 TABLET, FILM COATED ORAL NIGHTLY
Qty: 90 TABLET | Refills: 3 | Status: SHIPPED | OUTPATIENT
Start: 2022-03-24

## 2022-03-24 RX ORDER — CLOPIDOGREL BISULFATE 75 MG/1
150 TABLET ORAL DAILY
Qty: 180 TABLET | Refills: 3 | Status: SHIPPED | OUTPATIENT
Start: 2022-03-24

## 2022-03-24 RX ORDER — FUROSEMIDE 40 MG/1
40 TABLET ORAL 2 TIMES DAILY
Qty: 180 TABLET | Refills: 3 | Status: SHIPPED | OUTPATIENT
Start: 2022-03-24

## 2022-03-24 RX ORDER — PSYLLIUM HUSK 0.4 G
CAPSULE ORAL
Qty: 180 TABLET | Refills: 3 | Status: CANCELLED | OUTPATIENT
Start: 2022-03-24

## 2022-03-24 RX ORDER — ISOSORBIDE MONONITRATE 60 MG/1
60 TABLET, EXTENDED RELEASE ORAL 2 TIMES DAILY
Qty: 180 TABLET | Refills: 3 | Status: SHIPPED | OUTPATIENT
Start: 2022-03-24

## 2022-03-24 RX ORDER — METOPROLOL TARTRATE 50 MG/1
50 TABLET, FILM COATED ORAL 2 TIMES DAILY
Qty: 180 TABLET | Refills: 3 | Status: SHIPPED | OUTPATIENT
Start: 2022-03-24

## 2022-03-24 RX ORDER — NITROGLYCERIN 0.4 MG/1
0.4 TABLET SUBLINGUAL EVERY 5 MIN PRN
Qty: 25 TABLET | Refills: 3 | Status: SHIPPED | OUTPATIENT
Start: 2022-03-24

## 2022-03-24 RX ORDER — LISINOPRIL 5 MG/1
5 TABLET ORAL NIGHTLY
Qty: 90 TABLET | Refills: 3 | Status: SHIPPED | OUTPATIENT
Start: 2022-03-24

## 2022-03-24 NOTE — PATIENT INSTRUCTIONS
Plan:  1. Recommend 64 ounces or less of fluids daily to help decrease swelling  2. Monitor sodium intake  3. Recommend a cardiac healthy diet (low salt, avoid red meat, avoid fatty or fried foods, lots of fruits and vegetables) as well as regular moderate intensity activity for 30 minutes per day 3-5 times per week. 4. Continue using compression stocking and elevating your legs above your heart to help decrease swelling  5. EKG today  6.  Follow up in 9 months

## 2022-03-24 NOTE — LETTER
Carrillo Echevarria  1955  [address]   Cardiac Followup    Referring Provider:  Apoorva Oshea     Chief Complaint   Patient presents with    6 Month Follow-Up    Coronary Artery Disease    Congestive Heart Failure    Chest Pain    Shortness of Breath      Subjective: Mr Vianey Middleton presents today for cardiology follow up of CAD s/p stents, hx CABG, AS, HTN, HLD, and LE edema; c/o baseline exertional CP and swelling today    History of Present Illness:    Mr. Vianey Middleton is a 74 y.o. male with hx of CAD s/p 4V CABG 3/17, HTN, HLD, mild AS, s/p LAD and OM stent with POBA diagonal branch in 2003, s/p complex PCI complex LAD/diagonal and CCx/RCA in 8/17. Due to CP/SOB I referred him for [facility] on 3/17/17 showing MV CAD. Referred to CT surgery and on 3/21/17 he underwent 4V CABG by Dr. Ros Rivas with LIMA to LAD and reverse SVG to ramus OM distal to the stent and PDA. Most recent VL carotid Duplex showed 3/20/17 showed minimal plaque <50% bilaterally. Plavix increased 150 mg daily by Dr. Vinh Gonsalez at [facility] (he went for 2nd opinion) based on genetic testing results. Note LHC at [facility] per Dr. Carl Ren on 1/20/18 showed native disease but no PCI placed. EECP done afterwards. His most recent [facility] 8/22/2019 was abnormal, moderate risk showing moderate sized area of mild ischemia within the anterior-lateral wall. He f/u Dr. Carl Ren and had most recent [facility] on 8/2/21 that showed Severe diffuse disease with patent stents, patent LIMA-LAD and SVG-OM with very small graft to distal RCA occluded (new finding); no need for PCI; AS study showed no severe findings with essentially normal RHC pressures. Most recent BLE venous dopplers on 6/2/2020 negative for DVT. Most recent ECHO 7/6/21 EF=60-65% (EF=55% in 8/19, 3/17); AV velocity=2.65m/s; mean pressure gradient=17mmHg; mild AI/MR; mild-mod TR. Underwent bunionectomy right foot 1/2022. Today he states he is doing well overall.  He states he just got back from [place] for vacation. He is tolerating activities and exercises. He has stable, exertional CP with heavier activity mainly. He uses compression stocking for swelling in his legs. He states he notices increased swelling in the left leg. He notices occasional chest pain with heavy lifting. The pain goes away on it's own. He denies worsening chest pain. Patient denies sob, palpitations, dizziness or syncope. Past Medical History   has a past medical history of CAD (coronary artery disease), Family history of early CAD, Hyperlipidemia, Hypertension, and S/P coronary artery stent placement. Surgical History:   has a past surgical history that includes Coronary angioplasty with stent; Cardiac catheterization (09/26/2011); Coronary artery bypass graft (03/21/2017); Cardiac catheterization (03/17/2017); Coronary angioplasty with stent (05/12/2009); Coronary angioplasty with stent (08/15/2003); and Coronary angioplasty with stent. Social History:   reports that he quit smoking about 1990. His smoking use included cigarettes. He has a 12.00 pack-year smoking history. He has never used smokeless tobacco. He reports current alcohol use. He reports that he does not use drugs. 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 Family History:  Negative for significant heart disease in parents     Home Medications:  Prior to Admission medications    Medication Sig Start Date End Date Taking? Authorizing Provider   naproxen (NAPROXEN) 500 MG EC tablet Take 500 mg by mouth 2 times daily (with meals). Prn      Yes Historical Provider, MD   lisinopril (PRINIVIL;ZESTRIL) 10 MG tablet Take 1 tablet by mouth daily. 9/19/11  Yes Samina Wallis MD   aspirin 81 MG tablet Take 81 mg by mouth daily. Yes Historical Provider, MD   ranitidine (ZANTAC) 150 MG tablet Take 150 mg by mouth daily as needed. Yes Historical Provider, MD   atorvastatin (LIPITOR) 40 MG tablet Take 40 mg by mouth nightly.      Yes Historical Provider, MD   clopidogrel (PLAVIX) 75 MG tablet Take 75 mg by mouth daily. Yes Historical Provider, MD   nitroGLYCERIN (NITROSTAT) 0.4 MG SL tablet Place 1 tablet under the tongue every 5 minutes as needed for Chest pain. 9/19/11 9/18/12  Ana Paula Dubois MD        Allergies:  Patient has no known allergies. Review of Systems:   · Constitutional: there has been no unanticipated weight loss. There's been no change in energy level, sleep pattern, or activity level. · Eyes: No visual changes or diplopia. No scleral icterus. · ENT: No Headaches, hearing loss or vertigo. No mouth sores or sore throat. · Cardiovascular: Reviewed in HPI  · Respiratory:Clear,  No cough or wheezing, no sputum production. No hematemesis. · Gastrointestinal: No abdominal pain, appetite loss, blood in stools. No change in bowel or bladder habits. · Genitourinary: No dysuria, trouble voiding, or hematuria. · Musculoskeletal:  No gait disturbance, weakness or joint complaints. · Integumentary: No rash or pruritis. · Neurological: No headache, diplopia, change in muscle strength, numbness or tingling. No change in gait, balance, coordination, mood, affect, memory, mentation, behavior. · Psychiatric: No anxiety, no depression. · Endocrine: No malaise, fatigue or temperature intolerance. No excessive thirst, fluid intake, or urination. No tremor. · Hematologic/Lymphatic: No abnormal bruising or bleeding, blood clots or swollen lymph nodes. · Allergic/Immunologic: No nasal congestion or hives.     Physical Examination:    Vitals:    03/24/22 0830   BP: 104/62   Pulse: 70   SpO2: 98%        Wt Readings from Last 3 Encounters:   03/24/22 183 lb (83 kg)   07/19/21 186 lb (84.4 kg)   01/04/21 188 lb 8 oz (85.5 kg)     Constitutional and General Appearance: NAD   Respiratory:  · Normal excursion and expansion without use of accessory muscles  · Resp Auscultation: Normal breath sounds without dullness  Cardiovascular:  · The apical impulses not displaced  · Heart tones are crisp and normal  · Cervical veins are not engorged  · The carotid upstroke is normal in amplitude and contour without delay or bruit  · Normal S1S2, No S3, +Soft NANI  · Peripheral pulses are symmetrical and full  · There is no clubbing, cyanosis of the extremities. · Trace +1 edema  · Femoral Arteries: 2+ and equal  · Pedal Pulses: 2+ and equal   Abdomen:  · No masses or tenderness  · Liver/Spleen: No Abnormalities Noted  Neurological/Psychiatric:  · Alert and oriented in all spheres  · Moves all extremities well  · Exhibits normal gait balance and coordination  · No abnormalities of mood, affect, memory, mentation, or behavior are noted    GXT stress Test 7/20/17   ABNORMAL HIGH risk stress test  Normal LV size and systolic function. Left ventricular ejection fraction of  71%. Normal wall motion. There is a large severe defect in the anterior and  anterolateral walls at stress that completely reveres with rest consistent  with ischemia. Stress test 8/22/2019   Summary  Abnormal moderate risk myocardial perfusion study. There is moderate sized area of mild ischemia within the anterior-lateral  wall. Normal left ventricular size, wall motion, and function. The estimated left ventricular function is 73%. Echo 07/06/2021  Normal left ventricle size, wall thickness and systolic function with an estimated ejection fraction of 60-65%. No regional wall motion abnormalities are seen. Normal left ventricular diastolic filling pressure. The aortic valve is thickened/calcified with decreased leaflet mobility. The aortic valve area is calculated at 1.1 cm2 with max velocity of 2.65 m/sec, a maximum pressure gradient of 28 mmHg and a mean  pressure gradient of 17 mmHg. This is c/w moderate aortic stenosis. Qualitatively there is moderate to severe AS. MIld aortic regurgitation. Mild mitral regurgitation.   Mild to moderate tricuspid regurgitation. Systolic pulmonary artery pressure (SPAP) is normal and estimated at 25 mmHg (right atrial pressure 3 mmHg)       Lab Results   Component Value Date    CHOL 131 08/22/2019    CHOL 152 07/10/2018    CHOL 97 07/10/2018     Lab Results   Component Value Date    TRIG 142 08/22/2019    TRIG 160 (H) 07/10/2018    TRIG 152 (H) 07/05/2017     Lab Results   Component Value Date    HDL 56 08/22/2019    HDL 55 07/10/2018    HDL 52 07/05/2017     Lab Results   Component Value Date    LDLCALC 47 08/22/2019    LDLCALC 65 07/10/2018    LDLCALC 74 07/05/2017     Lab Results   Component Value Date    LABVLDL 28 08/22/2019    LABVLDL 30 07/05/2017    LABVLDL 26 03/21/2017       Labs:   BMP: 04/19/21 /  [employer] Na+ 137, K+ 4.8, BUN 18, Creatinine 0.97, Phos 2.4, AST 29, ALT 28  Lipids: 04/19/21 /  [employer] , TG 95, HDL 60, LDL 46    Assessment:     1. CAD (coronary artery disease): On 3/21/17 underwent 4V CABG by Dr. Dannielle Hollingsworth. Most recent [facility] on 9/18/2019 with severe diffuse disease but patent LIMA to LAD, SVG to small OM, and patent stents to LAD, OM, and RCA. No significant change when compared to previous films No need for PCI. See #5 below. He has stable exertional angina and will continue anti-anginal med regimen. 2. Essential hypertension, benign:  Well controlled and will continue current medical regimen. 3.  Hyperlipidemia: I personally reviewed most recent labs from 4/2021 Well controlled and at goal and will continue current medical regimen. Needs repeat labs in April 2022.    4. Lower extremity edema: Probable venous insufficiency. Most recent BLE venous dopplers on 6/2/2020 negative for DVT. He can use lasix PRN along with SWATI hose, salt/fluid restrict, raising legs    5. AS: Most recent ECHO on 07/06/21 ECHO showed normal EF of 60-65%; AV velocity=2.65m/s; mean pressure gradient=17mmHg. Follow clinically and will repeat study in couple of years. Plan:  1.  Recommend 64 ounces or less of fluids daily to help decrease swelling  2. Monitor sodium intake  3. Recommend a cardiac healthy diet (low salt, avoid red meat, avoid fatty or fried foods, lots of fruits and vegetables) as well as regular moderate intensity activity for 30 minutes per day 3-5 times per week. 4. Continue using compression stocking and elevating your legs above your heart to help decrease swelling  5. EKG today shows NSR 61bpm; LV; RBBB; NST change (no change 8/19)  6. Follow up in 9 months    This note was scribed in the presence of Carolina Edgar MD by José Miguel Rios RN.     I, Dr. Carolina Edgar, personally performed the services described in this documentation, as scribed by the above signed scribe in my presence. It is both accurate and complete to my knowledge. I agree with the details independently gathered by the clinical support staff, while the remaining scribed note accurately describes my personal service to the patient.

## 2022-03-25 LAB
CHOLESTEROL, TOTAL: 163 MG/DL
CHOLESTEROL/HDL RATIO: NORMAL
HDLC SERPL-MCNC: 69 MG/DL (ref 35–70)
LDL CHOLESTEROL CALCULATED: 76 MG/DL (ref 0–160)
NONHDLC SERPL-MCNC: 94 MG/DL
TRIGL SERPL-MCNC: 98 MG/DL
VLDLC SERPL CALC-MCNC: NORMAL MG/DL

## 2022-03-30 DIAGNOSIS — E78.00 PURE HYPERCHOLESTEROLEMIA: Primary | ICD-10-CM

## 2022-04-06 RX ORDER — FAMOTIDINE 40 MG/1
TABLET, FILM COATED ORAL
Qty: 180 TABLET | Refills: 3 | OUTPATIENT
Start: 2022-04-06

## 2022-04-12 NOTE — PATIENT INSTRUCTIONS
Plan:  1. VL dup venous lower extremity bilateral due to swelling. 2. Increase Lasix to 40 mg twice daily due to swelling. 3. Recheck BMP in 1 week   4. Continue all other medications as prescribed. 5. Follow up with me in 6 months. Your provider has ordered testing for further evaluation. An order/prescription has been included in your paper work.  To schedule outpatient testing, contact Central Scheduling by calling [employer] ([phone]). 6

## 2022-04-15 ENCOUNTER — TELEPHONE (OUTPATIENT)
Dept: CARDIOLOGY CLINIC | Age: 67
End: 2022-04-15

## 2022-04-15 NOTE — TELEPHONE ENCOUNTER
Manohar is supposed to be BID. He had 2nd opinion at [facility] and doc there recommended BID regimen. He has been taking this way for awhile. Please prescribe BID. Thanks.

## 2022-04-15 NOTE — TELEPHONE ENCOUNTER
[address] called to let [facility] know that Plavix 75 mg BID is requiring a PA. Pharmacy stated this is required from insurance since pt takes the Plavix BID. Last OV 03/24/2022 with [facility].

## 2022-04-21 NOTE — TELEPHONE ENCOUNTER
Spoke to Leah. [employer] and the [employer] is needing a PA on the dosing of the medication not the med itself. The pharm will be faxing over some info for me to start PA. PA was sent thru [employer] on 4/21/22.  Sary Bar

## 2022-05-11 ENCOUNTER — TELEPHONE (OUTPATIENT)
Dept: CARDIOLOGY CLINIC | Age: 67
End: 2022-05-11

## 2022-05-11 NOTE — TELEPHONE ENCOUNTER
Dr. Martinez Spine - patient would like to speak with you regarding seeing Dr. Daniel Ontiveros. I let him know you were OOT this week, he said no rush, he just wants to speak with you when you have time.

## 2022-05-16 NOTE — TELEPHONE ENCOUNTER
I called patient and he informed me that is new PCP is at [facility] and wants him to have  cards doc. He has seen Dr. Rika Flores prior and will switch to his care. Nothing personal but wants to consolidate PCP and specialist in same health system. Thanks.

## 2023-05-16 DIAGNOSIS — E78.00 PURE HYPERCHOLESTEROLEMIA: ICD-10-CM

## 2023-05-16 RX ORDER — ATORVASTATIN CALCIUM 80 MG/1
80 TABLET, FILM COATED ORAL NIGHTLY
Qty: 30 TABLET | Refills: 0 | OUTPATIENT
Start: 2023-05-16

## 2023-05-18 ENCOUNTER — TELEPHONE (OUTPATIENT)
Dept: CARDIOLOGY CLINIC | Age: 68
End: 2023-05-18

## 2023-05-18 NOTE — TELEPHONE ENCOUNTER
Pharmacy called [facility] to check the status of the request for the RX atorvastatin (LIPITOR) 80 MG tablet pt is leaving out town and requesting RX as soon as possible. atorvastatin (LIPITOR) 80 MG tablet   80 mg  90 tablet  Take 1 tablet by mouth nightly    [facility]. Akil Fregoso, [address] - F [phone]   [address] [place] [address], [place] [address]   Phone:  [phone]  Fax:  [phone]

## 2023-05-19 DIAGNOSIS — E78.00 PURE HYPERCHOLESTEROLEMIA: ICD-10-CM

## 2023-05-19 RX ORDER — ATORVASTATIN CALCIUM 80 MG/1
80 TABLET, FILM COATED ORAL NIGHTLY
Qty: 60 TABLET | Refills: 1 | OUTPATIENT
Start: 2023-05-19

## 2023-05-22 RX ORDER — ATORVASTATIN CALCIUM 80 MG/1
80 TABLET, FILM COATED ORAL NIGHTLY
Qty: 90 TABLET | Refills: 3 | Status: SHIPPED | OUTPATIENT
Start: 2023-05-22

## 2023-08-08 DIAGNOSIS — I20.0 UNSTABLE ANGINA (HCC): ICD-10-CM

## 2023-08-08 DIAGNOSIS — I25.10 CORONARY ARTERY DISEASE INVOLVING NATIVE CORONARY ARTERY OF NATIVE HEART WITHOUT ANGINA PECTORIS: ICD-10-CM

## 2023-08-09 RX ORDER — ISOSORBIDE MONONITRATE 60 MG/1
TABLET, EXTENDED RELEASE ORAL
Qty: 180 TABLET | Refills: 0 | OUTPATIENT
Start: 2023-08-09